# Patient Record
Sex: FEMALE | Race: WHITE | NOT HISPANIC OR LATINO | Employment: UNEMPLOYED | URBAN - METROPOLITAN AREA
[De-identification: names, ages, dates, MRNs, and addresses within clinical notes are randomized per-mention and may not be internally consistent; named-entity substitution may affect disease eponyms.]

---

## 2018-11-26 ENCOUNTER — ANESTHESIA (OUTPATIENT)
Dept: SURGERY | Facility: HOSPITAL | Age: 78
DRG: 481 | End: 2018-11-26
Payer: COMMERCIAL

## 2018-11-26 ENCOUNTER — ANESTHESIA EVENT (OUTPATIENT)
Dept: SURGERY | Facility: HOSPITAL | Age: 78
DRG: 481 | End: 2018-11-26
Payer: COMMERCIAL

## 2018-11-26 ENCOUNTER — HOSPITAL ENCOUNTER (INPATIENT)
Facility: HOSPITAL | Age: 78
LOS: 3 days | Discharge: NURSING, REHAB OR LONG-TERM CARE | DRG: 481 | End: 2018-11-29
Attending: EMERGENCY MEDICINE | Admitting: HOSPITALIST
Payer: COMMERCIAL

## 2018-11-26 DIAGNOSIS — S72.142A CLOSED DISPLACED INTERTROCHANTERIC FRACTURE OF LEFT FEMUR, INITIAL ENCOUNTER: Primary | ICD-10-CM

## 2018-11-26 DIAGNOSIS — Z01.818 PRE-OP EVALUATION: ICD-10-CM
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
## 2018-11-26 DIAGNOSIS — S72.142A CLOSED DISPLACED INTERTROCHANTERIC FRACTURE OF LEFT FEMUR: ICD-10-CM

## 2018-11-26 PROBLEM — D72.829 LEUKOCYTOSIS: Status: ACTIVE | Noted: 2018-11-26

## 2018-11-26 PROBLEM — E11.9 TYPE 2 DIABETES MELLITUS: Status: ACTIVE | Noted: 2018-11-26

## 2018-11-26 PROBLEM — K21.9 GERD (GASTROESOPHAGEAL REFLUX DISEASE): Status: ACTIVE | Noted: 2018-11-26

## 2018-11-26 PROBLEM — E78.5 HLD (HYPERLIPIDEMIA): Status: ACTIVE | Noted: 2018-11-26

## 2018-11-26 PROBLEM — I10 ESSENTIAL HYPERTENSION: Status: ACTIVE | Noted: 2018-11-26

## 2018-11-26 LAB
ABO + RH BLD: NORMAL
ALBUMIN SERPL BCP-MCNC: 3.8 G/DL
ALP SERPL-CCNC: 67 U/L
ALT SERPL W/O P-5'-P-CCNC: 11 U/L
ANION GAP SERPL CALC-SCNC: 10 MMOL/L
AST SERPL-CCNC: 17 U/L
BASOPHILS # BLD AUTO: 0.02 K/UL
BASOPHILS NFR BLD: 0.1 %
BILIRUB SERPL-MCNC: 0.3 MG/DL
BILIRUB UR QL STRIP: NEGATIVE
BLD GP AB SCN CELLS X3 SERPL QL: NORMAL
BUN SERPL-MCNC: 12 MG/DL
CALCIUM SERPL-MCNC: 9.7 MG/DL
CHLORIDE SERPL-SCNC: 99 MMOL/L
CLARITY UR: CLEAR
CO2 SERPL-SCNC: 28 MMOL/L
COLOR UR: YELLOW
CREAT SERPL-MCNC: 0.6 MG/DL
DIFFERENTIAL METHOD: ABNORMAL
EOSINOPHIL # BLD AUTO: 0.1 K/UL
EOSINOPHIL NFR BLD: 0.3 %
ERYTHROCYTE [DISTWIDTH] IN BLOOD BY AUTOMATED COUNT: 12.5 %
EST. GFR  (AFRICAN AMERICAN): >60 ML/MIN/1.73 M^2
EST. GFR  (NON AFRICAN AMERICAN): >60 ML/MIN/1.73 M^2
ESTIMATED AVG GLUCOSE: 126 MG/DL
GLUCOSE SERPL-MCNC: 167 MG/DL
GLUCOSE UR QL STRIP: NEGATIVE
HBA1C MFR BLD HPLC: 6 %
HCT VFR BLD AUTO: 36 %
HGB BLD-MCNC: 11.1 G/DL
HGB UR QL STRIP: NEGATIVE
INR PPP: 1
KETONES UR QL STRIP: NEGATIVE
LEUKOCYTE ESTERASE UR QL STRIP: NEGATIVE
LYMPHOCYTES # BLD AUTO: 1.4 K/UL
LYMPHOCYTES NFR BLD: 9 %
MCH RBC QN AUTO: 27.5 PG
MCHC RBC AUTO-ENTMCNC: 30.8 G/DL
MCV RBC AUTO: 89 FL
MONOCYTES # BLD AUTO: 0.7 K/UL
MONOCYTES NFR BLD: 4.5 %
NEUTROPHILS # BLD AUTO: 13 K/UL
NEUTROPHILS NFR BLD: 85.8 %
NITRITE UR QL STRIP: NEGATIVE
PH UR STRIP: 7 [PH] (ref 5–8)
PLATELET # BLD AUTO: 261 K/UL
PMV BLD AUTO: 9.1 FL
POTASSIUM SERPL-SCNC: 3.7 MMOL/L
PROT SERPL-MCNC: 7 G/DL
PROT UR QL STRIP: NEGATIVE
PROTHROMBIN TIME: 10.7 SEC
RBC # BLD AUTO: 4.03 M/UL
SODIUM SERPL-SCNC: 137 MMOL/L
SP GR UR STRIP: 1.02 (ref 1–1.03)
URN SPEC COLLECT METH UR: NORMAL
UROBILINOGEN UR STRIP-ACNC: NEGATIVE EU/DL
WBC # BLD AUTO: 15.16 K/UL

## 2018-11-26 PROCEDURE — 27245 TREAT THIGH FRACTURE: CPT | Mod: LT,,, | Performed by: ORTHOPAEDIC SURGERY

## 2018-11-26 PROCEDURE — 83036 HEMOGLOBIN GLYCOSYLATED A1C: CPT

## 2018-11-26 PROCEDURE — 25000003 PHARM REV CODE 250: Performed by: EMERGENCY MEDICINE

## 2018-11-26 PROCEDURE — 96361 HYDRATE IV INFUSION ADD-ON: CPT

## 2018-11-26 PROCEDURE — 80053 COMPREHEN METABOLIC PANEL: CPT

## 2018-11-26 PROCEDURE — 71000033 HC RECOVERY, INTIAL HOUR: Performed by: ORTHOPAEDIC SURGERY

## 2018-11-26 PROCEDURE — 36000711: Performed by: ORTHOPAEDIC SURGERY

## 2018-11-26 PROCEDURE — P9045 ALBUMIN (HUMAN), 5%, 250 ML: HCPCS | Mod: JG | Performed by: NURSE ANESTHETIST, CERTIFIED REGISTERED

## 2018-11-26 PROCEDURE — 86850 RBC ANTIBODY SCREEN: CPT

## 2018-11-26 PROCEDURE — 96374 THER/PROPH/DIAG INJ IV PUSH: CPT

## 2018-11-26 PROCEDURE — 25000003 PHARM REV CODE 250: Performed by: ORTHOPAEDIC SURGERY

## 2018-11-26 PROCEDURE — 86920 COMPATIBILITY TEST SPIN: CPT

## 2018-11-26 PROCEDURE — C1769 GUIDE WIRE: HCPCS | Performed by: ORTHOPAEDIC SURGERY

## 2018-11-26 PROCEDURE — 37000009 HC ANESTHESIA EA ADD 15 MINS: Performed by: ORTHOPAEDIC SURGERY

## 2018-11-26 PROCEDURE — 0QS706Z REPOSITION LEFT UPPER FEMUR WITH INTRAMEDULLARY INTERNAL FIXATION DEVICE, OPEN APPROACH: ICD-10-PCS | Performed by: ORTHOPAEDIC SURGERY

## 2018-11-26 PROCEDURE — 63600175 PHARM REV CODE 636 W HCPCS: Performed by: NURSE ANESTHETIST, CERTIFIED REGISTERED

## 2018-11-26 PROCEDURE — C1713 ANCHOR/SCREW BN/BN,TIS/BN: HCPCS | Performed by: ORTHOPAEDIC SURGERY

## 2018-11-26 PROCEDURE — 99285 EMERGENCY DEPT VISIT HI MDM: CPT | Mod: 25

## 2018-11-26 PROCEDURE — 25000003 PHARM REV CODE 250: Performed by: NURSE ANESTHETIST, CERTIFIED REGISTERED

## 2018-11-26 PROCEDURE — 37000008 HC ANESTHESIA 1ST 15 MINUTES: Performed by: ORTHOPAEDIC SURGERY

## 2018-11-26 PROCEDURE — 81003 URINALYSIS AUTO W/O SCOPE: CPT

## 2018-11-26 PROCEDURE — 63600175 PHARM REV CODE 636 W HCPCS: Performed by: ORTHOPAEDIC SURGERY

## 2018-11-26 PROCEDURE — 51702 INSERT TEMP BLADDER CATH: CPT

## 2018-11-26 PROCEDURE — 36000710: Performed by: ORTHOPAEDIC SURGERY

## 2018-11-26 PROCEDURE — 85610 PROTHROMBIN TIME: CPT

## 2018-11-26 PROCEDURE — 11000001 HC ACUTE MED/SURG PRIVATE ROOM

## 2018-11-26 PROCEDURE — 25000003 PHARM REV CODE 250: Performed by: PHYSICIAN ASSISTANT

## 2018-11-26 PROCEDURE — 27201423 OPTIME MED/SURG SUP & DEVICES STERILE SUPPLY: Performed by: ORTHOPAEDIC SURGERY

## 2018-11-26 PROCEDURE — 0QS7XZZ REPOSITION LEFT UPPER FEMUR, EXTERNAL APPROACH: ICD-10-PCS | Performed by: ORTHOPAEDIC SURGERY

## 2018-11-26 PROCEDURE — 63600175 PHARM REV CODE 636 W HCPCS: Mod: JG | Performed by: NURSE ANESTHETIST, CERTIFIED REGISTERED

## 2018-11-26 PROCEDURE — 85025 COMPLETE CBC W/AUTO DIFF WBC: CPT

## 2018-11-26 PROCEDURE — 63600175 PHARM REV CODE 636 W HCPCS: Performed by: EMERGENCY MEDICINE

## 2018-11-26 DEVICE — IMPLANTABLE DEVICE: Type: IMPLANTABLE DEVICE | Site: HIP | Status: FUNCTIONAL

## 2018-11-26 DEVICE — SCREW STRDRV REC T25 5X44 TTNM: Type: IMPLANTABLE DEVICE | Site: HIP | Status: FUNCTIONAL

## 2018-11-26 DEVICE — NAIL IM CANN 130 DEG 11X340 L: Type: IMPLANTABLE DEVICE | Site: HIP | Status: FUNCTIONAL

## 2018-11-26 RX ORDER — POLYETHYLENE GLYCOL 3350 17 G/17G
17 POWDER, FOR SOLUTION ORAL DAILY
Status: DISCONTINUED | OUTPATIENT
Start: 2018-11-27 | End: 2018-11-26

## 2018-11-26 RX ORDER — METFORMIN HYDROCHLORIDE 500 MG/1
500 TABLET ORAL 2 TIMES DAILY WITH MEALS
COMMUNITY

## 2018-11-26 RX ORDER — ALBUMIN HUMAN 50 G/1000ML
SOLUTION INTRAVENOUS CONTINUOUS PRN
Status: DISCONTINUED | OUTPATIENT
Start: 2018-11-26 | End: 2018-11-26

## 2018-11-26 RX ORDER — HYDROMORPHONE HYDROCHLORIDE 1 MG/ML
0.5 INJECTION, SOLUTION INTRAMUSCULAR; INTRAVENOUS; SUBCUTANEOUS
Status: COMPLETED | OUTPATIENT
Start: 2018-11-26 | End: 2018-11-26

## 2018-11-26 RX ORDER — ATORVASTATIN CALCIUM 10 MG/1
10 TABLET, FILM COATED ORAL DAILY
COMMUNITY

## 2018-11-26 RX ORDER — HYDROCHLOROTHIAZIDE 12.5 MG/1
12.5 TABLET ORAL DAILY
Status: DISCONTINUED | OUTPATIENT
Start: 2018-11-27 | End: 2018-11-27

## 2018-11-26 RX ORDER — GLYCOPYRROLATE 0.2 MG/ML
INJECTION INTRAMUSCULAR; INTRAVENOUS
Status: DISCONTINUED | OUTPATIENT
Start: 2018-11-26 | End: 2018-11-26

## 2018-11-26 RX ORDER — SUCCINYLCHOLINE CHLORIDE 20 MG/ML
INJECTION INTRAMUSCULAR; INTRAVENOUS
Status: DISCONTINUED | OUTPATIENT
Start: 2018-11-26 | End: 2018-11-26

## 2018-11-26 RX ORDER — ONDANSETRON 2 MG/ML
4 INJECTION INTRAMUSCULAR; INTRAVENOUS EVERY 8 HOURS PRN
Status: DISCONTINUED | OUTPATIENT
Start: 2018-11-26 | End: 2018-11-29 | Stop reason: HOSPADM

## 2018-11-26 RX ORDER — DEXAMETHASONE SODIUM PHOSPHATE 4 MG/ML
INJECTION, SOLUTION INTRA-ARTICULAR; INTRALESIONAL; INTRAMUSCULAR; INTRAVENOUS; SOFT TISSUE
Status: DISCONTINUED | OUTPATIENT
Start: 2018-11-26 | End: 2018-11-26

## 2018-11-26 RX ORDER — CEFAZOLIN SODIUM 1 G/50ML
1 SOLUTION INTRAVENOUS
Status: COMPLETED | OUTPATIENT
Start: 2018-11-27 | End: 2018-11-27

## 2018-11-26 RX ORDER — HYDROCODONE BITARTRATE AND ACETAMINOPHEN 5; 325 MG/1; MG/1
1 TABLET ORAL EVERY 4 HOURS PRN
Status: DISCONTINUED | OUTPATIENT
Start: 2018-11-26 | End: 2018-11-29 | Stop reason: HOSPADM

## 2018-11-26 RX ORDER — ONDANSETRON 2 MG/ML
4 INJECTION INTRAMUSCULAR; INTRAVENOUS DAILY PRN
Status: DISCONTINUED | OUTPATIENT
Start: 2018-11-26 | End: 2018-11-26 | Stop reason: HOSPADM

## 2018-11-26 RX ORDER — IBUPROFEN 200 MG
16 TABLET ORAL
Status: DISCONTINUED | OUTPATIENT
Start: 2018-11-26 | End: 2018-11-29 | Stop reason: HOSPADM

## 2018-11-26 RX ORDER — AMOXICILLIN 250 MG
1 CAPSULE ORAL 2 TIMES DAILY
Status: DISCONTINUED | OUTPATIENT
Start: 2018-11-26 | End: 2018-11-29 | Stop reason: HOSPADM

## 2018-11-26 RX ORDER — ROCURONIUM BROMIDE 10 MG/ML
INJECTION, SOLUTION INTRAVENOUS
Status: DISCONTINUED | OUTPATIENT
Start: 2018-11-26 | End: 2018-11-26

## 2018-11-26 RX ORDER — ASPIRIN 81 MG/1
81 TABLET ORAL DAILY
COMMUNITY

## 2018-11-26 RX ORDER — PHENYLEPHRINE HYDROCHLORIDE 10 MG/ML
INJECTION INTRAVENOUS
Status: DISCONTINUED | OUTPATIENT
Start: 2018-11-26 | End: 2018-11-26

## 2018-11-26 RX ORDER — GLUCAGON 1 MG
1 KIT INJECTION
Status: DISCONTINUED | OUTPATIENT
Start: 2018-11-26 | End: 2018-11-29 | Stop reason: HOSPADM

## 2018-11-26 RX ORDER — NEOSTIGMINE METHYLSULFATE 1 MG/ML
INJECTION, SOLUTION INTRAVENOUS
Status: DISCONTINUED | OUTPATIENT
Start: 2018-11-26 | End: 2018-11-26

## 2018-11-26 RX ORDER — ATORVASTATIN CALCIUM 10 MG/1
10 TABLET, FILM COATED ORAL DAILY
Status: DISCONTINUED | OUTPATIENT
Start: 2018-11-27 | End: 2018-11-29 | Stop reason: HOSPADM

## 2018-11-26 RX ORDER — IBUPROFEN 200 MG
24 TABLET ORAL
Status: DISCONTINUED | OUTPATIENT
Start: 2018-11-26 | End: 2018-11-29 | Stop reason: HOSPADM

## 2018-11-26 RX ORDER — SODIUM CHLORIDE 9 MG/ML
1000 INJECTION, SOLUTION INTRAVENOUS
Status: COMPLETED | OUTPATIENT
Start: 2018-11-26 | End: 2018-11-26

## 2018-11-26 RX ORDER — MORPHINE SULFATE 4 MG/ML
4 INJECTION, SOLUTION INTRAMUSCULAR; INTRAVENOUS EVERY 4 HOURS PRN
Status: DISCONTINUED | OUTPATIENT
Start: 2018-11-26 | End: 2018-11-29

## 2018-11-26 RX ORDER — ONDANSETRON 8 MG/1
8 TABLET, ORALLY DISINTEGRATING ORAL EVERY 8 HOURS PRN
Status: DISCONTINUED | OUTPATIENT
Start: 2018-11-26 | End: 2018-11-29 | Stop reason: HOSPADM

## 2018-11-26 RX ORDER — LOSARTAN POTASSIUM 50 MG/1
50 TABLET ORAL DAILY
Status: DISCONTINUED | OUTPATIENT
Start: 2018-11-27 | End: 2018-11-27

## 2018-11-26 RX ORDER — INSULIN ASPART 100 [IU]/ML
0-5 INJECTION, SOLUTION INTRAVENOUS; SUBCUTANEOUS
Status: DISCONTINUED | OUTPATIENT
Start: 2018-11-26 | End: 2018-11-29 | Stop reason: HOSPADM

## 2018-11-26 RX ORDER — HYDROMORPHONE HYDROCHLORIDE 2 MG/ML
0.5 INJECTION, SOLUTION INTRAMUSCULAR; INTRAVENOUS; SUBCUTANEOUS EVERY 5 MIN PRN
Status: DISCONTINUED | OUTPATIENT
Start: 2018-11-26 | End: 2018-11-26 | Stop reason: HOSPADM

## 2018-11-26 RX ORDER — PROPOFOL 10 MG/ML
VIAL (ML) INTRAVENOUS
Status: DISCONTINUED | OUTPATIENT
Start: 2018-11-26 | End: 2018-11-26

## 2018-11-26 RX ORDER — LOSARTAN POTASSIUM AND HYDROCHLOROTHIAZIDE 12.5; 5 MG/1; MG/1
1 TABLET ORAL DAILY
COMMUNITY
End: 2019-01-07

## 2018-11-26 RX ORDER — LIDOCAINE HCL/PF 100 MG/5ML
SYRINGE (ML) INTRAVENOUS
Status: DISCONTINUED | OUTPATIENT
Start: 2018-11-26 | End: 2018-11-26

## 2018-11-26 RX ORDER — BACITRACIN 50000 [IU]/1
INJECTION, POWDER, FOR SOLUTION INTRAMUSCULAR
Status: DISCONTINUED | OUTPATIENT
Start: 2018-11-26 | End: 2018-11-26 | Stop reason: HOSPADM

## 2018-11-26 RX ORDER — OXYCODONE HYDROCHLORIDE 5 MG/1
5 TABLET ORAL
Status: DISCONTINUED | OUTPATIENT
Start: 2018-11-26 | End: 2018-11-26 | Stop reason: HOSPADM

## 2018-11-26 RX ORDER — OXYCODONE AND ACETAMINOPHEN 7.5; 325 MG/1; MG/1
1 TABLET ORAL EVERY 4 HOURS PRN
Status: DISCONTINUED | OUTPATIENT
Start: 2018-11-26 | End: 2018-11-29

## 2018-11-26 RX ORDER — ACETAMINOPHEN 325 MG/1
650 TABLET ORAL EVERY 4 HOURS PRN
Status: DISCONTINUED | OUTPATIENT
Start: 2018-11-26 | End: 2018-11-29 | Stop reason: HOSPADM

## 2018-11-26 RX ORDER — FENTANYL CITRATE 50 UG/ML
INJECTION, SOLUTION INTRAMUSCULAR; INTRAVENOUS
Status: DISCONTINUED | OUTPATIENT
Start: 2018-11-26 | End: 2018-11-26

## 2018-11-26 RX ORDER — SODIUM CHLORIDE AND POTASSIUM CHLORIDE 150; 900 MG/100ML; MG/100ML
INJECTION, SOLUTION INTRAVENOUS CONTINUOUS
Status: DISCONTINUED | OUTPATIENT
Start: 2018-11-26 | End: 2018-11-29

## 2018-11-26 RX ORDER — SODIUM CHLORIDE 0.9 % (FLUSH) 0.9 %
5 SYRINGE (ML) INJECTION
Status: DISCONTINUED | OUTPATIENT
Start: 2018-11-26 | End: 2018-11-29 | Stop reason: HOSPADM

## 2018-11-26 RX ORDER — ENOXAPARIN SODIUM 100 MG/ML
30 INJECTION SUBCUTANEOUS EVERY 24 HOURS
Status: DISCONTINUED | OUTPATIENT
Start: 2018-11-27 | End: 2018-11-27

## 2018-11-26 RX ORDER — IPRATROPIUM BROMIDE AND ALBUTEROL SULFATE 2.5; .5 MG/3ML; MG/3ML
3 SOLUTION RESPIRATORY (INHALATION) EVERY 4 HOURS PRN
Status: DISCONTINUED | OUTPATIENT
Start: 2018-11-26 | End: 2018-11-29 | Stop reason: HOSPADM

## 2018-11-26 RX ORDER — SODIUM CHLORIDE, SODIUM LACTATE, POTASSIUM CHLORIDE, CALCIUM CHLORIDE 600; 310; 30; 20 MG/100ML; MG/100ML; MG/100ML; MG/100ML
INJECTION, SOLUTION INTRAVENOUS CONTINUOUS PRN
Status: DISCONTINUED | OUTPATIENT
Start: 2018-11-26 | End: 2018-11-26

## 2018-11-26 RX ORDER — FAMOTIDINE 20 MG/1
20 TABLET, FILM COATED ORAL 2 TIMES DAILY
Status: DISCONTINUED | OUTPATIENT
Start: 2018-11-26 | End: 2018-11-29 | Stop reason: HOSPADM

## 2018-11-26 RX ORDER — ONDANSETRON 2 MG/ML
INJECTION INTRAMUSCULAR; INTRAVENOUS
Status: DISCONTINUED | OUTPATIENT
Start: 2018-11-26 | End: 2018-11-26

## 2018-11-26 RX ORDER — POLYETHYLENE GLYCOL 3350 17 G/17G
17 POWDER, FOR SOLUTION ORAL DAILY
Status: DISCONTINUED | OUTPATIENT
Start: 2018-11-27 | End: 2018-11-27

## 2018-11-26 RX ORDER — ASPIRIN 81 MG/1
81 TABLET ORAL DAILY
Status: DISCONTINUED | OUTPATIENT
Start: 2018-11-27 | End: 2018-11-29 | Stop reason: HOSPADM

## 2018-11-26 RX ORDER — TRANEXAMIC ACID 100 MG/ML
INJECTION, SOLUTION INTRAVENOUS
Status: DISCONTINUED | OUTPATIENT
Start: 2018-11-26 | End: 2018-11-26

## 2018-11-26 RX ADMIN — FENTANYL CITRATE 50 MCG: 50 INJECTION, SOLUTION INTRAMUSCULAR; INTRAVENOUS at 06:11

## 2018-11-26 RX ADMIN — FENTANYL CITRATE 25 MCG: 50 INJECTION, SOLUTION INTRAMUSCULAR; INTRAVENOUS at 07:11

## 2018-11-26 RX ADMIN — SODIUM CHLORIDE 1000 ML: 0.9 INJECTION, SOLUTION INTRAVENOUS at 04:11

## 2018-11-26 RX ADMIN — PHENYLEPHRINE HYDROCHLORIDE 200 MCG: 10 INJECTION INTRAVENOUS at 05:11

## 2018-11-26 RX ADMIN — STANDARDIZED SENNA CONCENTRATE AND DOCUSATE SODIUM 1 TABLET: 8.6; 5 TABLET, FILM COATED ORAL at 09:11

## 2018-11-26 RX ADMIN — LIDOCAINE HYDROCHLORIDE 100 MG: 20 INJECTION, SOLUTION INTRAVENOUS at 05:11

## 2018-11-26 RX ADMIN — ONDANSETRON 8 MG: 2 INJECTION, SOLUTION INTRAMUSCULAR; INTRAVENOUS at 07:11

## 2018-11-26 RX ADMIN — ROCURONIUM BROMIDE 10 MG: 10 INJECTION, SOLUTION INTRAVENOUS at 07:11

## 2018-11-26 RX ADMIN — GLYCOPYRROLATE 0.2 MG: 0.2 INJECTION, SOLUTION INTRAMUSCULAR; INTRAVENOUS at 06:11

## 2018-11-26 RX ADMIN — TRANEXAMIC ACID 1000 MG: 100 INJECTION, SOLUTION INTRAVENOUS at 05:11

## 2018-11-26 RX ADMIN — FAMOTIDINE 20 MG: 20 TABLET ORAL at 09:11

## 2018-11-26 RX ADMIN — PHENYLEPHRINE HYDROCHLORIDE 100 MCG: 10 INJECTION INTRAVENOUS at 05:11

## 2018-11-26 RX ADMIN — HYDROMORPHONE HYDROCHLORIDE 0.5 MG: 1 INJECTION, SOLUTION INTRAMUSCULAR; INTRAVENOUS; SUBCUTANEOUS at 02:11

## 2018-11-26 RX ADMIN — DEXTROSE 2 G: 50 INJECTION, SOLUTION INTRAVENOUS at 05:11

## 2018-11-26 RX ADMIN — FENTANYL CITRATE 25 MCG: 50 INJECTION, SOLUTION INTRAMUSCULAR; INTRAVENOUS at 08:11

## 2018-11-26 RX ADMIN — ALBUMIN (HUMAN): 2.5 SOLUTION INTRAVENOUS at 07:11

## 2018-11-26 RX ADMIN — SODIUM CHLORIDE, SODIUM LACTATE, POTASSIUM CHLORIDE, AND CALCIUM CHLORIDE: .6; .31; .03; .02 INJECTION, SOLUTION INTRAVENOUS at 05:11

## 2018-11-26 RX ADMIN — GLYCOPYRROLATE 0.8 MG: 0.2 INJECTION, SOLUTION INTRAMUSCULAR; INTRAVENOUS at 07:11

## 2018-11-26 RX ADMIN — VASOPRESSIN 1 UNITS: 20 INJECTION, SOLUTION INTRAMUSCULAR; SUBCUTANEOUS at 06:11

## 2018-11-26 RX ADMIN — HYDROCODONE BITARTRATE AND ACETAMINOPHEN 1 TABLET: 5; 325 TABLET ORAL at 08:11

## 2018-11-26 RX ADMIN — ROCURONIUM BROMIDE 5 MG: 10 INJECTION, SOLUTION INTRAVENOUS at 05:11

## 2018-11-26 RX ADMIN — SUCCINYLCHOLINE CHLORIDE 120 MG: 20 INJECTION, SOLUTION INTRAMUSCULAR; INTRAVENOUS at 05:11

## 2018-11-26 RX ADMIN — ROCURONIUM BROMIDE 25 MG: 10 INJECTION, SOLUTION INTRAVENOUS at 05:11

## 2018-11-26 RX ADMIN — DEXAMETHASONE SODIUM PHOSPHATE 8 MG: 4 INJECTION, SOLUTION INTRAMUSCULAR; INTRAVENOUS at 05:11

## 2018-11-26 RX ADMIN — NEOSTIGMINE METHYLSULFATE 5 MG: 1 INJECTION INTRAVENOUS at 07:11

## 2018-11-26 RX ADMIN — PHENYLEPHRINE HYDROCHLORIDE 100 MCG: 10 INJECTION INTRAVENOUS at 06:11

## 2018-11-26 RX ADMIN — PHENYLEPHRINE HYDROCHLORIDE 300 MCG: 10 INJECTION INTRAVENOUS at 06:11

## 2018-11-26 RX ADMIN — FENTANYL CITRATE 150 MCG: 50 INJECTION, SOLUTION INTRAMUSCULAR; INTRAVENOUS at 05:11

## 2018-11-26 RX ADMIN — ALBUMIN (HUMAN): 2.5 SOLUTION INTRAVENOUS at 06:11

## 2018-11-26 RX ADMIN — PROPOFOL 100 MG: 10 INJECTION, EMULSION INTRAVENOUS at 05:11

## 2018-11-26 RX ADMIN — POTASSIUM CHLORIDE AND SODIUM CHLORIDE: 900; 150 INJECTION, SOLUTION INTRAVENOUS at 10:11

## 2018-11-26 NOTE — OP NOTE
DATE OF PROCEDURE:   11/26/2018    PREOPERATIVE DIAGNOSIS:  Left hip subtrochanteric femur fracture, displaced.    POSTOPERATIVE DIAGNOSIS:  Left hip subtrochanteric femur fracture, displaced.    OPERATIVE PROCEDURE:  Intramedullary emelina fixation (Synthes TFN nail)   interlocking screws, left femur subtrochanteric fracture.    SURGEON:  Keshav Guthrie Jr., M.D.    ANESTHESIA:  General endotracheal.    ESTIMATED BLOOD LOSS:  450 mL    COMPLICATIONS:  None.    SPECIMENS:  None.    BRIEF INDICATIONS:  A 78-year-old female fell, sustained a complex subtroch   fracture, left hip taken to surgery for the above procedure.    OPERATIVE PROCEDURE IN DETAIL:  After operative consent was obtained, the   patient brought to the Operating Room, placed supine on the operating room   table.  Anesthesia by GET method performed by the Anesthesia staff.  After the   patient was asleep, carefully transferred to the fracture table.  The left leg   placed in longitudinal traction and a closed reduction performed with C-arm   control, the right leg flexed at the hip and knee carefully padded.  The   fracture was complex, subtrochanteric, and displaced.  There was flexion to the   proximal fragment and rotation, which could not be entirely corrected with a   closed reduction maneuver.  It was felt that this was acceptable.  However, the   surgery was started.  The left hip was prepped and draped out in the normal   sterile fashion.  The C-arm was brought to localize the skin incision made with   a 10 blade beginning at the greater trochanter and extending proximally.  Deep   dissection was divided in line with the skin.  The deep fascia opened with the   Bovie and a finger used to palpate the greater trochanter.  An awl was used to   enter the greater trochanter under C-arm control and a guidewire was used to   enter the femoral canal across the fracture site and under C-arm control passed   into the distal fragment.  The guidewire was  placed all the way to the knee and   the correct length was sounded at 340 mm.  The reamer was then used to carefully   ream over the guidewire; first proximally and then distally all the way up to a   12.5 carefully preventing any additional fractures.  Then, after removal of the   reamer, the emelina, the Synthes TFN nail was carefully placed over the guidewire,   and this had the effect of reducing the fracture for the most part carefully   impacted into place.  Under C-arm control, the screw holes were lined up with   the femoral neck and head, checked in AP and lateral planes and then the   outrigger was used to make a skin incision followed by percutaneous drilling   into the femoral neck and head under C-arm control in the center, AP and lateral   planes.  This was followed by drilling and insertion of a 105 mm screw into the   femoral neck and head capturing the fragment in good position.  Some   compression was then performed compressed the fragment; however, there was some   flexion and rotation that could not be corrected.  This was acceptable.    The proximal locking screw was used to lock in the lag screw and then the   outrigger was removed.  Attention then turned to the distal locking screw.  The   leg was abducted slightly and a C-arm brought into a lateral position to allow   fluoroscopic alignment and guidance.  Followed by a percutaneous small stab   incision and the drill inserted down to the bone, checked under perfect [place]   technique, drilling directly across first pass was successful with both cortices   followed by insertion of a 44 mm distal locking screw, achieving good purchase,   checked in AP and lateral planes.  The C-arm then removed after final pictures   were taken and compression performed.  All 3 incisions were then thoroughly   irrigated with antibiotic saline solution.  Hemostasis achieved with the Bovie.    The deep fascia closed with #1 Vicryl, subcutaneous tissue closed with  2-0   Vicryl and staples on the skin.  Sterile dressings applied followed by an ABD   pad.  The patient then extubated and brought to the Recovery Room in stable   condition.  All sponge and needle counts reported as correct.  No complications.      SANDRA/SUDHA  dd: 11/26/2018 20:21:58 (CST)  td: 11/26/2018 22:29:55 (CST)  Doc ID   #[id number]  Job ID #[id number]    CC:

## 2018-11-26 NOTE — HPI
"Ms. Luda Carranza is a 79 yo woman with HTN, HLD, GERD, and NIDDM II who presents with hip fracture. History is limited by a language barrier and her son-in-law acts as . At the time of interview pt is very drowsy from pain medication. She is visiting her family in the [place] from [place]. She is generally very independent and walks unassisted. She has occasional issues with balance. She does yoga daily. JYOTI reports that she had a fall from standing when she was walking in the kitchen and "lost balance", after which she experienced significant pain in the left hip. Her family brought her to the ED, where she was found to have a comminuted intertrochanteric left femur fracture. CXR showed diffuse nonspecific interstitial coarsening, possibly mild pulmonary edema. Vital signs were stable other than an elevated BP (-183). Labs were significant for WBC 15. UA was negative  She was admitted to hospital medicine with an ortho consult, planning for repair the evening of 11/26.   "

## 2018-11-26 NOTE — SUBJECTIVE & OBJECTIVE
Past Medical History:   Diagnosis Date    Diabetes mellitus     GERD (gastroesophageal reflux disease)     Hypercholesteremia        History reviewed. No pertinent surgical history.    Review of patient's allergies indicates:  No Known Allergies    No current facility-administered medications on file prior to encounter.      Current Outpatient Medications on File Prior to Encounter   Medication Sig    aspirin (ECOTRIN) 81 MG EC tablet Take 81 mg by mouth once daily.    atorvastatin (LIPITOR) 10 MG tablet Take 10 mg by mouth once daily.    losartan-hydrochlorothiazide 50-12.5 mg (HYZAAR) 50-12.5 mg per tablet Take 1 tablet by mouth once daily.    metFORMIN (GLUCOPHAGE) 500 MG tablet Take 500 mg by mouth 2 (two) times daily with meals.    ranitidine (ZANTAC) 300 MG tablet Take 300 mg by mouth every evening.     Family History     None        Tobacco Use    Smoking status: Never Smoker    Smokeless tobacco: Never Used   Substance and Sexual Activity    Alcohol use: No     Frequency: Never    Drug use: No    Sexual activity: Not on file     Review of Systems   Unable to perform ROS: Other (language barrier, pt drowsy)   Constitutional: Negative for fever.   Respiratory: Negative for shortness of breath.    Musculoskeletal: Positive for gait problem.   Psychiatric/Behavioral: Negative for confusion.     Objective:     Vital Signs (Most Recent):  Temp: 98 °F (36.7 °C) (11/26/18 1355)  Pulse: 77 (11/26/18 1659)  Resp: 16 (11/26/18 1659)  BP: (!) 150/67 (11/26/18 1659)  SpO2: 100 % (11/26/18 1659) Vital Signs (24h Range):  Temp:  [98 °F (36.7 °C)] 98 °F (36.7 °C)  Pulse:  [64-77] 77  Resp:  [16] 16  SpO2:  [100 %] 100 %  BP: (150-183)/(67-88) 150/67     Weight: 70 kg (154 lb 5.2 oz)  Body mass index is 27.34 kg/m².    Physical Exam   Constitutional: She appears well-developed and well-nourished. No distress.   HENT:   Head: Normocephalic and atraumatic.   Eyes: EOM are normal. Pupils are equal, round, and  reactive to light.   Cardiovascular: Normal rate and regular rhythm.   Pulmonary/Chest: Effort normal and breath sounds normal.   CTA anteriorly   Abdominal: Soft. Bowel sounds are normal.   Musculoskeletal: She exhibits tenderness and deformity.   L leg shortened and externally rotated  Pulses and sensation intact L foot   Neurological: She is alert. No cranial nerve deficit or sensory deficit. She exhibits normal muscle tone.   Skin: Skin is warm and dry. She is not diaphoretic.   Psychiatric: She has a normal mood and affect. Her behavior is normal.         CRANIAL NERVES     CN III, IV, VI   Pupils are equal, round, and reactive to light.  Extraocular motions are normal.        Significant Labs:   BMP:   Recent Labs   Lab 11/26/18  1606   *      K 3.7   CL 99   CO2 28   BUN 12   CREATININE 0.6   CALCIUM 9.7     CBC:   Recent Labs   Lab 11/26/18  1606   WBC 15.16*   HGB 11.1*   HCT 36.0*        Coagulation:   Recent Labs   Lab 11/26/18  1606   INR 1.0     Urine Studies:   Recent Labs   Lab 11/26/18  1606   COLORU Yellow   APPEARANCEUA Clear   PHUR 7.0   SPECGRAV 1.020   PROTEINUA Negative   GLUCUA Negative   KETONESU Negative   BILIRUBINUA Negative   OCCULTUA Negative   NITRITE Negative   UROBILINOGEN Negative   LEUKOCYTESUR Negative       Significant Imaging: I have reviewed all pertinent imaging results/findings within the past 24 hours.   Imaging Results          X-Ray Chest 1 View (Final result)  Result time 11/26/18 15:43:05    Final result by Doug Greer MD (11/26/18 15:43:05)                 Impression:      As above.      Electronically signed by: Doug Greer MD  Date:    11/26/2018  Time:    15:43             Narrative:    EXAMINATION:  XR CHEST 1 VIEW    CLINICAL HISTORY:  hip fracture;    TECHNIQUE:  Single frontal view of the chest was performed.    COMPARISON:  None    FINDINGS:  Patient is slightly rotated.  Cardiac silhouette is enlarged with bilateral mild diffuse  nonspecific interstitial coarsening which may represent mild pulmonary edema/CHF pattern, with interstitial pneumonia or chronic interstitial lung changes not excluded.  No focal consolidation, pleural effusion or pneumothorax.  Calcific atherosclerosis with tortuosity of the thoracic aorta.  Thickened right paratracheal stripe, commonly secondary to ectatic vasculature in this age group.  No acute osseous process seen.  PA and lateral views can be obtained.                               X-Ray Femur Ap/Lat Left (Final result)     Abnormal  Result time 11/26/18 15:02:17    Final result by Amaya Morrison MD (11/26/18 15:02:17)                 Impression:      Comminuted intertrochanteric left femur fracture.    This report was flagged in Epic as abnormal.      Electronically signed by: Amaya Morrison  Date:    11/26/2018  Time:    15:02             Narrative:    EXAMINATION:  XR FEMUR 2 VIEW LEFT; XR HIP 2 VIEW LEFT    CLINICAL HISTORY:  fall;Unspecified fall, initial encounter    TECHNIQUE:  Five views of the left femur were obtained.  Two views of the left hip were obtained.    COMPARISON:  None}    FINDINGS:  Comminuted intertrochanteric fracture with mild angulation and medial displacement of the lesser trochanter.    The femoral heads remain well seated within the acetabula.  The sacroiliac joints are symmetric.  Pubic symphysis is not widened.  No aggressive osseous abnormality.                               X-Ray Hip 2 View Left (Final result)     Abnormal  Result time 11/26/18 15:02:17    Final result by Amaya Morrison MD (11/26/18 15:02:17)                 Impression:      Comminuted intertrochanteric left femur fracture.    This report was flagged in Epic as abnormal.      Electronically signed by: Amaya Morrison  Date:    11/26/2018  Time:    15:02             Narrative:    EXAMINATION:  XR FEMUR 2 VIEW LEFT; XR HIP 2 VIEW LEFT    CLINICAL HISTORY:  fall;Unspecified fall, initial encounter    TECHNIQUE:  Five  views of the left femur were obtained.  Two views of the left hip were obtained.    COMPARISON:  None}    FINDINGS:  Comminuted intertrochanteric fracture with mild angulation and medial displacement of the lesser trochanter.    The femoral heads remain well seated within the acetabula.  The sacroiliac joints are symmetric.  Pubic symphysis is not widened.  No aggressive osseous abnormality.

## 2018-11-26 NOTE — ANESTHESIA PREPROCEDURE EVALUATION
11/26/2018  Luda Carranza is a 78 y.o., female with pmhx of HTN, DM2, GERD, anxiety, and obesity scheduled for ORIF Left femur     Patient visiting from [place], has PCP there. No personal hx of surgery, denies family hx of issues with anesthesia    Last meal 9am 11/26/18    Anesthesia Evaluation    I have reviewed the Patient Summary Reports.    I have reviewed the Nursing Notes.      Review of Systems  Anesthesia Hx:  No previous Anesthesia  Denies Family Hx of Anesthesia complications.   Denies Personal Hx of Anesthesia complications.   Social:  Non-Smoker    Cardiovascular:   Hypertension    Pulmonary:  Pulmonary Normal    Renal/:  Renal/ Normal     Hepatic/GI:   GERD, well controlled    Neurological:  Neurology Normal    Psych:   anxiety          Physical Exam  General:  Obesity    Airway/Jaw/Neck:  Airway Findings: Mouth Opening: Small, but > 3cm Tongue: Normal  Mallampati: III  TM Distance: 4 - 6 cm  Jaw/Neck Findings:  Neck ROM: Normal ROM      Dental:  Dental Findings:Denies loose/chipped teeth   Chest/Lungs:  Chest/Lungs Findings: Clear to auscultation     Heart/Vascular:  Heart Findings: Rate: Normal  Rhythm: Regular Rhythm             Anesthesia Plan  Type of Anesthesia, risks & benefits discussed:  Anesthesia Type:  general, regional, spinal, CSE  Patient's Preference:   Intra-op Monitoring Plan: standard ASA monitors  Intra-op Monitoring Plan Comments:   Post Op Pain Control Plan:   Post Op Pain Control Plan Comments:   Induction:   IV  Beta Blocker:  Patient is not currently on a Beta-Blocker (No further documentation required).       Informed Consent: Patient understands risks and agrees with Anesthesia plan.  Questions answered. Anesthesia consent signed with patient.  ASA Score: 3     Day of Surgery Review of History & Physical:            Ready For Surgery From Anesthesia Perspective.

## 2018-11-26 NOTE — ED PROVIDER NOTES
Encounter Date: 11/26/2018    SCRIBE #1 NOTE: I, Radha Marks, am scribing for, and in the presence of,  Dr. Tapan Nuñez. I have scribed the entire note.       History     Chief Complaint   Patient presents with    Hip Pain     fell on tile at home from standing position, complains of left hip pain     Luda Carranza is a 78 y.o. female who presents in the ED today via EMS with complaints of left hip pain since falling minutes prior to arrival. Per pt's son-in-law, she was standing in kitchen, when she appeared to have lost her balance and fell over onto the tile floor. Pt was unable to get up or stand after fall. Pt denies hitting her head at any time.           The history is provided by the patient and a relative. The history is limited by a language barrier. A  was used (son-in-law).     Review of patient's allergies indicates:  Allergies not on file  No past medical history on file.  No past surgical history on file.  No family history on file.  Social History     Tobacco Use    Smoking status: Not on file   Substance Use Topics    Alcohol use: Not on file    Drug use: Not on file     Review of Systems   Constitutional: Negative for chills and fever.   HENT: Negative for congestion, rhinorrhea and sore throat.    Eyes: Negative for redness and visual disturbance.   Respiratory: Negative for cough, shortness of breath and wheezing.    Cardiovascular: Negative for chest pain and palpitations.   Gastrointestinal: Negative for abdominal pain, diarrhea, nausea and vomiting.   Genitourinary: Negative for dysuria and hematuria.   Musculoskeletal: Positive for arthralgias (left hip pain). Negative for back pain, myalgias and neck pain.   Skin: Negative for rash.   Neurological: Negative for dizziness, weakness and light-headedness.   Psychiatric/Behavioral: Negative for confusion.   All other systems reviewed and are negative.      Physical Exam     Initial Vitals   BP Pulse Resp Temp SpO2    11/26/18 1355 11/26/18 1355 11/26/18 1453 11/26/18 1355 11/26/18 1355   (!) 183/88 68 16 98 °F (36.7 °C) 100 %      MAP       --                Physical Exam    Nursing note and vitals reviewed.  Constitutional: She appears well-developed and well-nourished. She is not diaphoretic. No distress.   HENT:   Head: Normocephalic and atraumatic.   Mouth/Throat: Oropharynx is clear and moist.   Eyes: Conjunctivae and EOM are normal. Pupils are equal, round, and reactive to light.   Neck: Normal range of motion. Neck supple.   Cardiovascular: Normal rate, regular rhythm and normal heart sounds. Exam reveals no gallop and no friction rub.    No murmur heard.  Pulmonary/Chest: Breath sounds normal. She has no wheezes. She has no rhonchi. She has no rales.   Abdominal: Soft. Bowel sounds are normal. There is no tenderness. There is no rebound and no guarding.   Musculoskeletal: She exhibits no edema or tenderness.   No cervical vertebral tenderness.   Tenderness over the greater trochanter of the left hip.  External rotation and shortening of left extremity.    Lymphadenopathy:     She has no cervical adenopathy.   Neurological: She is alert and oriented to person, place, and time. She has normal strength.   Skin: Skin is warm and dry. Capillary refill takes less than 2 seconds. No rash noted.         ED Course   Procedures  Labs Reviewed   CBC W/ AUTO DIFFERENTIAL - Abnormal; Notable for the following components:       Result Value    WBC 15.16 (*)     Hemoglobin 11.1 (*)     Hematocrit 36.0 (*)     MCHC 30.8 (*)     MPV 9.1 (*)     Gran # (ANC) 13.0 (*)     Gran% 85.8 (*)     Lymph% 9.0 (*)     All other components within normal limits   COMPREHENSIVE METABOLIC PANEL - Abnormal; Notable for the following components:    Glucose 167 (*)     All other components within normal limits   URINALYSIS   PROTIME-INR   HEMOGLOBIN A1C   TYPE & SCREEN     EKG Readings: (Independently Interpreted)   Rhythm: Normal Sinus Rhythm. Heart  Rate: 65. ST Segments: Normal ST Segments. ST Segment Depression: V5 and V6.       Imaging Results          X-Ray Chest 1 View (In process)                X-Ray Femur Ap/Lat Left (Final result)     Abnormal  Result time 11/26/18 15:02:17    Final result by Amaya Morrison MD (11/26/18 15:02:17)                 Impression:      Comminuted intertrochanteric left femur fracture.    This report was flagged in Epic as abnormal.      Electronically signed by: Amaya Morrison  Date:    11/26/2018  Time:    15:02             Narrative:    EXAMINATION:  XR FEMUR 2 VIEW LEFT; XR HIP 2 VIEW LEFT    CLINICAL HISTORY:  fall;Unspecified fall, initial encounter    TECHNIQUE:  Five views of the left femur were obtained.  Two views of the left hip were obtained.    COMPARISON:  None}    FINDINGS:  Comminuted intertrochanteric fracture with mild angulation and medial displacement of the lesser trochanter.    The femoral heads remain well seated within the acetabula.  The sacroiliac joints are symmetric.  Pubic symphysis is not widened.  No aggressive osseous abnormality.                               X-Ray Hip 2 View Left (Final result)     Abnormal  Result time 11/26/18 15:02:17    Final result by Amaya Morrison MD (11/26/18 15:02:17)                 Impression:      Comminuted intertrochanteric left femur fracture.    This report was flagged in Epic as abnormal.      Electronically signed by: Amaya Morrison  Date:    11/26/2018  Time:    15:02             Narrative:    EXAMINATION:  XR FEMUR 2 VIEW LEFT; XR HIP 2 VIEW LEFT    CLINICAL HISTORY:  fall;Unspecified fall, initial encounter    TECHNIQUE:  Five views of the left femur were obtained.  Two views of the left hip were obtained.    COMPARISON:  None}    FINDINGS:  Comminuted intertrochanteric fracture with mild angulation and medial displacement of the lesser trochanter.    The femoral heads remain well seated within the acetabula.  The sacroiliac joints are symmetric.  Pubic  symphysis is not widened.  No aggressive osseous abnormality.                                 Medical Decision Making:   Clinical Tests:   Radiological Study: Ordered and Reviewed  ED Management:  3:23 PM Case discussed with Orthopedics, Dr. Guthrie, and Internist, Dr. Rodney, who will admit pt.                         Clinical Impression:     1. Closed displaced intertrochanteric fracture of left femur, initial encounter    2. Fall    3. Pre-op evaluation                        I, Dr. Tapan Nuñez, personally performed the services described in this documentation. All medical record entries made by the scribe were at my direction and in my presence. I have reviewed the chart and agree that the record reflects my personal performance and is accurate and complete. Tapan Nuñez MD.  3:49 PM 11/26/2018                               Tapan Nuñez MD  11/26/18 [id number]

## 2018-11-26 NOTE — ASSESSMENT & PLAN NOTE
Pt with fall from standing d/t being off balance. XR showing L comminuted intertrochanteric fracture. Pt active and independent prior to fall.   -Attempt to clarify story when patient is no longer so drowsy  -Ortho consult, plan for OR 11/26  -Pain control and bowel regimen  -PT/OT

## 2018-11-26 NOTE — ASSESSMENT & PLAN NOTE
WBC mildly elevated at 15.16. UA negative, CXR showing a mild pulmonary edema pattern. Afebrile, HR normal, BP moderately elevated.   -Trend WBC

## 2018-11-26 NOTE — H&P
"[facility] Medicine  History & Physical    Patient Name: Luda Carranza  MRN: [id number]  Admission Date: 11/26/2018  Attending Physician: Tapan Nuñez, *   Primary Care Provider: Primary Doctor No         Patient information was obtained from patient, relative(s) and ER records.     Subjective:     Principal Problem:<principal problem not specified>    Chief Complaint:   Chief Complaint   Patient presents with    Hip Pain     fell on tile at home from standing position, complains of left hip pain        HPI: Ms. Luda Carranza is a 77 yo woman with HTN, HLD, GERD, and NIDDM II who presents with hip fracture. History is limited by a language barrier and her son-in-law acts as . At the time of interview pt is very drowsy from pain medication. She is visiting her family in the [place] from [place]. She is generally very independent and walks unassisted. She has occasional issues with balance. She does yoga daily. JYOTI reports that she had a fall from standing when she was walking in the kitchen and "lost balance", after which she experienced significant pain in the left hip. Her family brought her to the ED, where she was found to have a comminuted intertrochanteric left femur fracture. CXR showed diffuse nonspecific interstitial coarsening, possibly mild pulmonary edema. Vital signs were stable other than an elevated BP (-183). Labs were significant for WBC 15. UA was negative  She was admitted to hospital medicine with an ortho consult, planning for repair the evening of 11/26.     Past Medical History:   Diagnosis Date    Diabetes mellitus     GERD (gastroesophageal reflux disease)     Hypercholesteremia        History reviewed. No pertinent surgical history.    Review of patient's allergies indicates:  No Known Allergies    No current facility-administered medications on file prior to encounter.      Current Outpatient Medications on File Prior to Encounter "   Medication Sig    aspirin (ECOTRIN) 81 MG EC tablet Take 81 mg by mouth once daily.    atorvastatin (LIPITOR) 10 MG tablet Take 10 mg by mouth once daily.    losartan-hydrochlorothiazide 50-12.5 mg (HYZAAR) 50-12.5 mg per tablet Take 1 tablet by mouth once daily.    metFORMIN (GLUCOPHAGE) 500 MG tablet Take 500 mg by mouth 2 (two) times daily with meals.    ranitidine (ZANTAC) 300 MG tablet Take 300 mg by mouth every evening.     Family History     None        Tobacco Use    Smoking status: Never Smoker    Smokeless tobacco: Never Used   Substance and Sexual Activity    Alcohol use: No     Frequency: Never    Drug use: No    Sexual activity: Not on file     Review of Systems   Unable to perform ROS: Other (language barrier, pt drowsy)   Constitutional: Negative for fever.   Respiratory: Negative for shortness of breath.    Musculoskeletal: Positive for gait problem.   Psychiatric/Behavioral: Negative for confusion.     Objective:     Vital Signs (Most Recent):  Temp: 98 °F (36.7 °C) (11/26/18 1355)  Pulse: 77 (11/26/18 1659)  Resp: 16 (11/26/18 1659)  BP: (!) 150/67 (11/26/18 1659)  SpO2: 100 % (11/26/18 1659) Vital Signs (24h Range):  Temp:  [98 °F (36.7 °C)] 98 °F (36.7 °C)  Pulse:  [64-77] 77  Resp:  [16] 16  SpO2:  [100 %] 100 %  BP: (150-183)/(67-88) 150/67     Weight: 70 kg (154 lb 5.2 oz)  Body mass index is 27.34 kg/m².    Physical Exam   Constitutional: She appears well-developed and well-nourished. No distress.   HENT:   Head: Normocephalic and atraumatic.   Eyes: EOM are normal. Pupils are equal, round, and reactive to light.   Cardiovascular: Normal rate and regular rhythm.   Pulmonary/Chest: Effort normal and breath sounds normal.   CTA anteriorly   Abdominal: Soft. Bowel sounds are normal.   Musculoskeletal: She exhibits tenderness and deformity.   L leg shortened and externally rotated  Pulses and sensation intact L foot   Neurological: She is alert. No cranial nerve deficit or sensory  deficit. She exhibits normal muscle tone.   Skin: Skin is warm and dry. She is not diaphoretic.   Psychiatric: She has a normal mood and affect. Her behavior is normal.         CRANIAL NERVES     CN III, IV, VI   Pupils are equal, round, and reactive to light.  Extraocular motions are normal.        Significant Labs:   BMP:   Recent Labs   Lab 11/26/18  1606   *      K 3.7   CL 99   CO2 28   BUN 12   CREATININE 0.6   CALCIUM 9.7     CBC:   Recent Labs   Lab 11/26/18  1606   WBC 15.16*   HGB 11.1*   HCT 36.0*        Coagulation:   Recent Labs   Lab 11/26/18  1606   INR 1.0     Urine Studies:   Recent Labs   Lab 11/26/18  1606   COLORU Yellow   APPEARANCEUA Clear   PHUR 7.0   SPECGRAV 1.020   PROTEINUA Negative   GLUCUA Negative   KETONESU Negative   BILIRUBINUA Negative   OCCULTUA Negative   NITRITE Negative   UROBILINOGEN Negative   LEUKOCYTESUR Negative       Significant Imaging: I have reviewed all pertinent imaging results/findings within the past 24 hours.   Imaging Results          X-Ray Chest 1 View (Final result)  Result time 11/26/18 15:43:05    Final result by Doug Greer MD (11/26/18 15:43:05)                 Impression:      As above.      Electronically signed by: Doug Greer MD  Date:    11/26/2018  Time:    15:43             Narrative:    EXAMINATION:  XR CHEST 1 VIEW    CLINICAL HISTORY:  hip fracture;    TECHNIQUE:  Single frontal view of the chest was performed.    COMPARISON:  None    FINDINGS:  Patient is slightly rotated.  Cardiac silhouette is enlarged with bilateral mild diffuse nonspecific interstitial coarsening which may represent mild pulmonary edema/CHF pattern, with interstitial pneumonia or chronic interstitial lung changes not excluded.  No focal consolidation, pleural effusion or pneumothorax.  Calcific atherosclerosis with tortuosity of the thoracic aorta.  Thickened right paratracheal stripe, commonly secondary to ectatic vasculature in this age group.  No  acute osseous process seen.  PA and lateral views can be obtained.                               X-Ray Femur Ap/Lat Left (Final result)     Abnormal  Result time 11/26/18 15:02:17    Final result by Amaya Morrison MD (11/26/18 15:02:17)                 Impression:      Comminuted intertrochanteric left femur fracture.    This report was flagged in Epic as abnormal.      Electronically signed by: Amaya Morrison  Date:    11/26/2018  Time:    15:02             Narrative:    EXAMINATION:  XR FEMUR 2 VIEW LEFT; XR HIP 2 VIEW LEFT    CLINICAL HISTORY:  fall;Unspecified fall, initial encounter    TECHNIQUE:  Five views of the left femur were obtained.  Two views of the left hip were obtained.    COMPARISON:  None}    FINDINGS:  Comminuted intertrochanteric fracture with mild angulation and medial displacement of the lesser trochanter.    The femoral heads remain well seated within the acetabula.  The sacroiliac joints are symmetric.  Pubic symphysis is not widened.  No aggressive osseous abnormality.                               X-Ray Hip 2 View Left (Final result)     Abnormal  Result time 11/26/18 15:02:17    Final result by Amaya Morrison MD (11/26/18 15:02:17)                 Impression:      Comminuted intertrochanteric left femur fracture.    This report was flagged in Epic as abnormal.      Electronically signed by: Amaya Morrison  Date:    11/26/2018  Time:    15:02             Narrative:    EXAMINATION:  XR FEMUR 2 VIEW LEFT; XR HIP 2 VIEW LEFT    CLINICAL HISTORY:  fall;Unspecified fall, initial encounter    TECHNIQUE:  Five views of the left femur were obtained.  Two views of the left hip were obtained.    COMPARISON:  None}    FINDINGS:  Comminuted intertrochanteric fracture with mild angulation and medial displacement of the lesser trochanter.    The femoral heads remain well seated within the acetabula.  The sacroiliac joints are symmetric.  Pubic symphysis is not widened.  No aggressive osseous abnormality.                                   Assessment/Plan:     Leukocytosis    WBC mildly elevated at 15.16. UA negative, CXR showing a mild pulmonary edema pattern. Afebrile, HR normal, BP moderately elevated.   -Trend WBC     HLD (hyperlipidemia)    -Continue home statin     GERD (gastroesophageal reflux disease)    No complaints or epigastric tenderness  -Give famotidine for home ranitidine       Type 2 diabetes mellitus    Pt takes metformin only  -Monitor BG AC and HS and use SSI  -Diabetic diet post op  -Check A1C     Essential hypertension    Pt takes losartan-hctz  -Continue home regimen  -Monitor     Closed displaced intertrochanteric fracture of left femur    Pt with fall from standing d/t being off balance. XR showing L comminuted intertrochanteric fracture. Pt active and independent prior to fall.   -Attempt to clarify story when patient is no longer so drowsy  -Ortho consult, plan for OR 11/26  -Pain control and bowel regimen  -PT/OT       VTE Risk Mitigation (From admission, onward)    None             DEBBIE BonnerC  Department of Hospital Medicine   [facility]

## 2018-11-27 PROBLEM — E78.5 HLD (HYPERLIPIDEMIA): Chronic | Status: ACTIVE | Noted: 2018-11-26

## 2018-11-27 PROBLEM — I10 ESSENTIAL HYPERTENSION: Chronic | Status: ACTIVE | Noted: 2018-11-26

## 2018-11-27 PROBLEM — E11.9 TYPE 2 DIABETES MELLITUS WITHOUT COMPLICATION, WITHOUT LONG-TERM CURRENT USE OF INSULIN: Chronic | Status: ACTIVE | Noted: 2018-11-26

## 2018-11-27 PROBLEM — D62 POSTOPERATIVE ANEMIA DUE TO ACUTE BLOOD LOSS: Status: ACTIVE | Noted: 2018-11-27

## 2018-11-27 PROBLEM — D72.829 LEUKOCYTOSIS: Status: RESOLVED | Noted: 2018-11-26 | Resolved: 2018-11-27

## 2018-11-27 LAB
ANION GAP SERPL CALC-SCNC: 6 MMOL/L
BASOPHILS # BLD AUTO: 0 K/UL
BASOPHILS NFR BLD: 0 %
BLD PROD TYP BPU: NORMAL
BLOOD UNIT EXPIRATION DATE: NORMAL
BLOOD UNIT TYPE CODE: 8400
BLOOD UNIT TYPE: NORMAL
BUN SERPL-MCNC: 11 MG/DL
CALCIUM SERPL-MCNC: 8.8 MG/DL
CHLORIDE SERPL-SCNC: 99 MMOL/L
CO2 SERPL-SCNC: 30 MMOL/L
CODING SYSTEM: NORMAL
CREAT SERPL-MCNC: 0.6 MG/DL
DIFFERENTIAL METHOD: ABNORMAL
DISPENSE STATUS: NORMAL
EOSINOPHIL # BLD AUTO: 0 K/UL
EOSINOPHIL NFR BLD: 0 %
ERYTHROCYTE [DISTWIDTH] IN BLOOD BY AUTOMATED COUNT: 12.5 %
EST. GFR  (AFRICAN AMERICAN): >60 ML/MIN/1.73 M^2
EST. GFR  (NON AFRICAN AMERICAN): >60 ML/MIN/1.73 M^2
GLUCOSE SERPL-MCNC: 167 MG/DL
HCT VFR BLD AUTO: 23.6 %
HCT VFR BLD AUTO: 24.9 %
HGB BLD-MCNC: 7.3 G/DL
HGB BLD-MCNC: 7.7 G/DL
LYMPHOCYTES # BLD AUTO: 1.2 K/UL
LYMPHOCYTES NFR BLD: 10.8 %
MCH RBC QN AUTO: 27.2 PG
MCHC RBC AUTO-ENTMCNC: 30.9 G/DL
MCV RBC AUTO: 88 FL
MONOCYTES # BLD AUTO: 0.8 K/UL
MONOCYTES NFR BLD: 6.9 %
NEUTROPHILS # BLD AUTO: 9.2 K/UL
NEUTROPHILS NFR BLD: 82.1 %
PLATELET # BLD AUTO: 203 K/UL
PMV BLD AUTO: 9.3 FL
POCT GLUCOSE: 151 MG/DL (ref 70–110)
POCT GLUCOSE: 177 MG/DL (ref 70–110)
POCT GLUCOSE: 199 MG/DL (ref 70–110)
POCT GLUCOSE: 237 MG/DL (ref 70–110)
POTASSIUM SERPL-SCNC: 4.3 MMOL/L
RBC # BLD AUTO: 2.83 M/UL
SODIUM SERPL-SCNC: 135 MMOL/L
TRANS ERYTHROCYTES VOL PATIENT: NORMAL ML
WBC # BLD AUTO: 11.15 K/UL

## 2018-11-27 PROCEDURE — 36430 TRANSFUSION BLD/BLD COMPNT: CPT

## 2018-11-27 PROCEDURE — 85018 HEMOGLOBIN: CPT

## 2018-11-27 PROCEDURE — P9021 RED BLOOD CELLS UNIT: HCPCS

## 2018-11-27 PROCEDURE — 11000001 HC ACUTE MED/SURG PRIVATE ROOM

## 2018-11-27 PROCEDURE — 97530 THERAPEUTIC ACTIVITIES: CPT

## 2018-11-27 PROCEDURE — 63600175 PHARM REV CODE 636 W HCPCS: Performed by: ORTHOPAEDIC SURGERY

## 2018-11-27 PROCEDURE — G8979 MOBILITY GOAL STATUS: HCPCS | Mod: CK

## 2018-11-27 PROCEDURE — 85025 COMPLETE CBC W/AUTO DIFF WBC: CPT

## 2018-11-27 PROCEDURE — 94761 N-INVAS EAR/PLS OXIMETRY MLT: CPT

## 2018-11-27 PROCEDURE — 25000003 PHARM REV CODE 250: Performed by: ORTHOPAEDIC SURGERY

## 2018-11-27 PROCEDURE — 97165 OT EVAL LOW COMPLEX 30 MIN: CPT

## 2018-11-27 PROCEDURE — 63600175 PHARM REV CODE 636 W HCPCS: Performed by: PHYSICIAN ASSISTANT

## 2018-11-27 PROCEDURE — 97110 THERAPEUTIC EXERCISES: CPT

## 2018-11-27 PROCEDURE — 97161 PT EVAL LOW COMPLEX 20 MIN: CPT

## 2018-11-27 PROCEDURE — 25000003 PHARM REV CODE 250: Performed by: PHYSICIAN ASSISTANT

## 2018-11-27 PROCEDURE — G8978 MOBILITY CURRENT STATUS: HCPCS | Mod: CL

## 2018-11-27 PROCEDURE — 80048 BASIC METABOLIC PNL TOTAL CA: CPT

## 2018-11-27 PROCEDURE — 27000221 HC OXYGEN, UP TO 24 HOURS

## 2018-11-27 PROCEDURE — 36415 COLL VENOUS BLD VENIPUNCTURE: CPT

## 2018-11-27 PROCEDURE — 85014 HEMATOCRIT: CPT

## 2018-11-27 RX ORDER — FERROUS GLUCONATE 324(38)MG
324 TABLET ORAL
Status: DISCONTINUED | OUTPATIENT
Start: 2018-11-28 | End: 2018-11-29 | Stop reason: HOSPADM

## 2018-11-27 RX ORDER — ENOXAPARIN SODIUM 100 MG/ML
40 INJECTION SUBCUTANEOUS EVERY 24 HOURS
Status: DISCONTINUED | OUTPATIENT
Start: 2018-11-28 | End: 2018-11-29 | Stop reason: HOSPADM

## 2018-11-27 RX ORDER — HYDROCODONE BITARTRATE AND ACETAMINOPHEN 500; 5 MG/1; MG/1
TABLET ORAL
Status: DISCONTINUED | OUTPATIENT
Start: 2018-11-27 | End: 2018-11-29 | Stop reason: HOSPADM

## 2018-11-27 RX ORDER — HYDROCODONE BITARTRATE AND ACETAMINOPHEN 500; 5 MG/1; MG/1
TABLET ORAL
Status: DISCONTINUED | OUTPATIENT
Start: 2018-11-27 | End: 2018-11-27

## 2018-11-27 RX ADMIN — CEFAZOLIN SODIUM 1 G: 1 SOLUTION INTRAVENOUS at 08:11

## 2018-11-27 RX ADMIN — ATORVASTATIN CALCIUM 10 MG: 10 TABLET, FILM COATED ORAL at 09:11

## 2018-11-27 RX ADMIN — CEFAZOLIN SODIUM 1 G: 1 SOLUTION INTRAVENOUS at 01:11

## 2018-11-27 RX ADMIN — FAMOTIDINE 20 MG: 20 TABLET ORAL at 08:11

## 2018-11-27 RX ADMIN — INSULIN ASPART 2 UNITS: 100 INJECTION, SOLUTION INTRAVENOUS; SUBCUTANEOUS at 12:11

## 2018-11-27 RX ADMIN — ASPIRIN 81 MG: 81 TABLET, COATED ORAL at 09:11

## 2018-11-27 RX ADMIN — HYDROCHLOROTHIAZIDE 12.5 MG: 12.5 TABLET ORAL at 10:11

## 2018-11-27 RX ADMIN — OXYCODONE HYDROCHLORIDE AND ACETAMINOPHEN 1 TABLET: 7.5; 325 TABLET ORAL at 10:11

## 2018-11-27 RX ADMIN — HYDROCODONE BITARTRATE AND ACETAMINOPHEN 1 TABLET: 5; 325 TABLET ORAL at 04:11

## 2018-11-27 RX ADMIN — POTASSIUM CHLORIDE AND SODIUM CHLORIDE: 900; 150 INJECTION, SOLUTION INTRAVENOUS at 10:11

## 2018-11-27 RX ADMIN — CEFAZOLIN SODIUM 1 G: 1 SOLUTION INTRAVENOUS at 10:11

## 2018-11-27 RX ADMIN — POTASSIUM CHLORIDE AND SODIUM CHLORIDE: 900; 150 INJECTION, SOLUTION INTRAVENOUS at 08:11

## 2018-11-27 RX ADMIN — ENOXAPARIN SODIUM 30 MG: 100 INJECTION SUBCUTANEOUS at 09:11

## 2018-11-27 RX ADMIN — STANDARDIZED SENNA CONCENTRATE AND DOCUSATE SODIUM 1 TABLET: 8.6; 5 TABLET, FILM COATED ORAL at 08:11

## 2018-11-27 RX ADMIN — HYDROCODONE BITARTRATE AND ACETAMINOPHEN 1 TABLET: 5; 325 TABLET ORAL at 01:11

## 2018-11-27 RX ADMIN — OXYCODONE HYDROCHLORIDE AND ACETAMINOPHEN 1 TABLET: 7.5; 325 TABLET ORAL at 05:11

## 2018-11-27 RX ADMIN — FAMOTIDINE 20 MG: 20 TABLET ORAL at 09:11

## 2018-11-27 RX ADMIN — LOSARTAN POTASSIUM 50 MG: 50 TABLET, FILM COATED ORAL at 10:11

## 2018-11-27 RX ADMIN — OXYCODONE HYDROCHLORIDE AND ACETAMINOPHEN 1 TABLET: 7.5; 325 TABLET ORAL at 01:11

## 2018-11-27 NOTE — PLAN OF CARE
TN met with pt and son in law Jersey Chairez -     pt is visiting from [place]   s/p l femur fx - s/p orif 11/26 - Dr. Guthrie      independent prior to admit -- no hh, no dme      per son in  - open to SNF plcm[facility] - prefers [facility]  -- then any available facility.     TN sent referral to [facility] and [facility] facilities as well as [facility] and [facility].  Rehab.         11/27/18 2777   Discharge Assessment   Assessment Type Discharge Planning Assessment   Confirmed/corrected address and phone number on facesheet? Yes   Assessment information obtained from? Caregiver;Medical Record   Expected Length of Stay (days) 3   Communicated expected length of stay with patient/caregiver yes   Prior to hospitilization cognitive status: Alert/Oriented   Prior to hospitalization functional status: Independent   Current cognitive status: Alert/Oriented   Current Functional Status: Independent   Lives With child(eric), adult   Able to Return to Prior Arrangements unable to determine at this time (comments)   Is patient able to care for self after discharge? Yes   Who are your caregiver(s) and their phone number(s)? (son in law Jersey Chairez      )   Readmission Within The Last 30 Days no previous admission in last 30 days   Patient currently being followed by outpatient case management? No   Patient currently receives any other outside agency services? No   Equipment Currently Used at Home none   Do you have any problems affording any of your prescribed medications? TBD   Is the patient taking medications as prescribed? yes   Does the patient have transportation home? Yes   Transportation Available family or friend will provide   Does the patient receive services at the [facility]? No   Discharge Plan A Home;Home with family   Patient/Family In Agreement With Plan yes

## 2018-11-27 NOTE — HOSPITAL COURSE
Pt s/p left femur ORIF (POD #3), doing well. She reports minimal pain, tolerating PT. Neurovascular intact. Post op Hbg 7.7>7.3>8.4, improved s/p 1 unit PRBC. VSS. Pt with temp 101 overnight, likely reactive no indication of acute infection. No leukocytosis, surgical site clean/dry/intact with no signs of infection. CXR negative. UA pending on discharge. Pt denies urinary symptoms. Pt medically stable for transfer to inpatient rehab.

## 2018-11-27 NOTE — PROGRESS NOTES
Pharmacist Renal Dose Adjustment Note    Luda Carranza is a 78 y.o. female being treated with the medication Lovenox    Patient Data:    Vital Signs (Most Recent):  Temp: 98.1 °F (36.7 °C) (11/27/18 1119)  Pulse: 87 (11/27/18 1119)  Resp: 16 (11/27/18 1119)  BP: (!) 98/48 (11/27/18 1119)  SpO2: (!) 93 % (11/27/18 1216)   Vital Signs (72h Range):  Temp:  [98 °F (36.7 °C)-98.8 °F (37.1 °C)]   Pulse:  []   Resp:  [16-20]   BP: ()/(47-88)   SpO2:  [91 %-100 %]      Recent Labs   Lab 11/26/18  1606 11/27/18  0530   CREATININE 0.6 0.6     Serum creatinine: 0.6 mg/dL 11/27/18 0530  Estimated creatinine clearance: 78.1 mL/min    Medication: Lovenox dose: 30mg frequency: Q24Hr will be changed to medication: Lovenox dose: 40mg frequency: Q24Hr    Pharmacist's Name: Nazanin Rodrigues  Pharmacist's Extension: [phone]

## 2018-11-27 NOTE — PT/OT/SLP EVAL
Physical Therapy Evaluation and Treatment    Patient Name:  Luda Carranza   MRN:  [id number]    Recommendations:     Discharge Recommendations:  nursing facility, skilled   Discharge Equipment Recommendations: (TBD)   Barriers to discharge: increased need for physical assist and limited activity tolerance    Assessment:     Luda Carranza is a 78 y.o. female admitted with a medical diagnosis of Closed displaced intertrochanteric fracture of left femur.  She presents with the following impairments/functional limitations:  weakness, impaired endurance, impaired self care skills, impaired functional mobilty, gait instability, impaired balance, decreased lower extremity function, pain, decreased ROM, decreased safety awareness, edema. Pt with limited standing and gait tolerance this date as she was unable to take steps forward during AM session. Recommending SNF placement upon d/c at this time.    Rehab Prognosis: Good; patient would benefit from acute skilled PT services to address these deficits and reach maximum level of function.      Recent Surgery: Procedure(s) (LRB):  ORIF, FRACTURE, FEMUR, INTERTROCHANTERIC (Left) 1 Day Post-Op    Plan:     During this hospitalization, patient to be seen BID to address the above listed problems via gait training, therapeutic activities, therapeutic exercises  · Plan of Care Expires:  12/27/18   Plan of Care Reviewed with: patient    Subjective     Communicated with NAVARRO Finnegan prior to session.  Patient found supine with HOB elevated upon PT entry to room, agreeable to evaluation.      Pt understands little English and primarily speaks Telegu. Language line utilized throughout session.    Chief Complaint: L hip pain  Patient comments/goals: pt reporting initially that she cannot stand because her L hip/thigh hurts a lot  Pain/Comfort:  · Pain Rating 1: (reports pain in L hip throughout session but did not rate pain)  · Location - Side 1: Left  · Location - Orientation 1:  generalized  · Location 1: hip  · Pain Addressed 1: Reposition, Distraction, Cessation of Activity, Nurse notified    Patients cultural, spiritual, Presybeterian conflicts given the current situation: none reported    Living Environment:  Pt lives in [place] but is in town visiting her family. Pt is staying with her daughter and son-in-law who live in a [facility] with 2 [place].   Prior to admission, patients level of function was independent without AD for mobility and ADLs.  Patient has the following equipment: none.  DME owned (not currently used): single point cane.  Upon discharge, patient will have assistance from her family-- pt reporting her daughter fell and broke her hip 10 days ago and that her son-in-law and granddaughter will be assisting as needed upon d/c.    Objective:     Patient found with: bed alarm, peripheral IV     General Precautions: Standard, fall   Orthopedic Precautions:LLE weight bearing as tolerated   Braces: N/A     Exams:  · Postural Exam:  Patient presented with the following abnormalities:    · -       Rounded shoulders  · Skin Integrity/Edema:      · -       Skin integrity: surgical incision L hip  · RLE ROM: not formally assessed but appears WFLs  · RLE Strength: not formally assessed but appears WFLs  · LLE ROM: WFL except L knee ext lackikng ~10 deg and hip ROM limited by pain  · LLE Strength: grossly 3-/5    Functional Mobility:  · Bed Mobility:     · Scooting: minimum assistance and use of UEs on bedrail to scoot up towards HOB  · Supine to Sit: maximum assistance  · Sit to Supine: maximum assistance   · Transfers:     · Sit to Stand: minimum assistance with rolling walker  · Gait: 1 side step right with RW and max A    AM-PAC 6 CLICK MOBILITY  Total Score:11       Therapeutic Activities and Exercises:  AM session:  Educated on LE supine exercises: APs, heel slides (AAROM LLE), and QS.  Sat EOB and completed 5 reps L LAQs.  Completed 1 stand as reported above with max cues and demo for  upright posture, use of UEs on RW to off-weight LLE.  Completed weight shifting and step in place.  Returned to supine.    PM session:   RN pre-medicated pt with pain medication prior to session. L hip pain was 4/10 at start of session and 5/10 at end of session.  Pt's son-in-law present and provided translation during session. Offered language line free of charge but deferred.  BLE supine exercises x 10 reps: APs, heel slides (LLE AAROM), hip abduction slides (LLE AAROM).  Sat EOB and presented with increased posterior leaning and required min A initially then progressed to close SBA.  Completed 5 reps LLE LAQs with AAROM.  Completed 1 stand and side stepping 2-3 minimal steps, scooting RLE and unable to raise R foot off the floor.  Pt c/o dizziness and returned to supine with improved symptoms.  Scooted self towards HOB with use of UEs on bed rail with PT maintaining RLE position on bed and pt required increased time and effort to complete.    Patient left HOB elevated with all lines intact, call button in reach, bed alarm on, RN notified and pt's son-in-law present.    GOALS:   Multidisciplinary Problems     Physical Therapy Goals        Problem: Physical Therapy Goal    Goal Priority Disciplines Outcome Goal Variances Interventions   Physical Therapy Goal     PT, PT/OT Ongoing (interventions implemented as appropriate)     Description:  Goals to be met by: 18     Patient will increase functional independence with mobility by performin. Supine <> sit with MInimal Assistance  2. Sit to stand transfer with Contact Guard Assistance  3. Bed to chair transfer with Contact Guard Assistance using Rolling Walker  4. Gait  x 50 feet with Contact Guard Assistance using Rolling Walker.   5. Lower extremity exercise program x 10 reps per handout, with assistance as needed                      History:     Past Medical History:   Diagnosis Date    Diabetes mellitus     GERD (gastroesophageal reflux disease)      Hypercholesteremia        Past Surgical History:   Procedure Laterality Date    OPEN REDUCTION AND INTERNAL FIXATION (ORIF) OF INTERTROCHANTERIC FRACTURE OF FEMUR Left 11/26/2018    Procedure: ORIF, FRACTURE, FEMUR, INTERTROCHANTERIC;  Surgeon: Keshav Guthrie Jr., MD;  Location: [facility];  Service: Orthopedics;  Laterality: Left;  need synthes TFN nail  c arm    ORIF FEMUR FRACTURE Left 11/26/2018    ORIF, FRACTURE, FEMUR, INTERTROCHANTERIC Left 11/26/2018    Performed by Keshav Guthrie Jr., MD at [facility]       Clinical Decision Making:     History  Co-morbidities and personal factors that may impact the plan of care Examination  Body Structures and Functions, activity limitations and participation restrictions that may impact the plan of care Clinical Presentation   Decision Making/ Complexity Score   Co-morbidities:   [] Time since onset of injury / illness / exacerbation  [x] Status of current condition  []Patient's cognitive status and safety concerns    [] Multiple Medical Problems (see med hx)  Personal Factors:   [] Patient's age  [] Prior Level of function   [] Patient's home situation (environment and family support)  [] Patient's level of motivation  [] Expected progression of patient      HISTORY:(criteria)    [] 99698 - no personal factors/history    [x] 54033 - has 1-2 personal factor/comorbidity     [] 47230 - has >3 personal factor/comorbidity     Body Regions:  [] Objective examination findings  [] Head     []  Neck  [] Trunk   [] Upper Extremity  [x] Lower Extremity    Body Systems:  [] For communication ability, affect, cognition, language, and learning style: the assessment of the ability to make needs known, consciousness, orientation (person, place, and time), expected emotional /behavioral responses, and learning preferences (eg, learning barriers, education  needs)  [x] For the neuromuscular system: a general assessment of gross coordinated movement (eg, balance, gait, locomotion,  transfers, and transitions) and motor function  (motor control and motor learning)  [x] For the musculoskeletal system: the assessment of gross symmetry, gross range of motion, gross strength, height, and weight  [] For the integumentary system: the assessment of pliability(texture), presence of scar formation, skin color, and skin integrity  [] For cardiovascular/pulmonary system: the assessment of heart rate, respiratory rate, blood pressure, and edema     Activity limitations:    [] Patient's cognitive status and saf ety concerns          [] Status of current condition      [] Weight bearing restriction  [] Cardiopulmunary Restriction    Participation Restrictions:   [] Goals and goal agreement with the patient     [] Rehab potential (prognosis) and probable outcome      Examination of Body System: (criteria)    [x] 10482 - addressing 1-2 elements    [] 15956 - addressing a total of 3 or more elements     [] 10845 -  Addressing a total of 4 or more elements         Clinical Presentation: (criteria)  Stable - 49302     On examination of body system using standardized tests and measures patient presents with 1-2 elements from any of the following: body structures and functions, activity limitations, and/or participation restrictions.  Leading to a clinical presentation that is considered stable and/or uncomplicated                              Clinical Decision Making  (Eval Complexity):  Low- 28257     Time Tracking:     PT Received On: 11/27/18  PT Start Time: 0858     PT Stop Time: 0928        PM session: 7664-4296  PT Total Time (min): 30 min +32 min (AM session with OT)    Billable Minutes: Evaluation 15, Therapeutic Activity 15 and Therapeutic Exercise 17      Kylah Willson, PT  11/27/2018

## 2018-11-27 NOTE — PLAN OF CARE
"Patient drowsy. VSS. When asked about pain patient states " a little bit". Medicated for pain per prn orders. Dr. Preet Guajardo to release patient from PACU.  Report to Ruth BRICEÑO with time for questions, verbalized understanding. Patients family updated on room assignment. Patient discharged to floor in bed.   "

## 2018-11-27 NOTE — PT/OT/SLP EVAL
"Occupational Therapy   Evaluation    Name: Luda Carranza  MRN: [id number]  Admitting Diagnosis:  Closed displaced intertrochanteric fracture of left femur 1 Day Post-Op    Recommendations:     Discharge Recommendations: nursing facility, skilled  Discharge Equipment Recommendations:  walker, rolling, bedside commode, bath bench  Barriers to discharge:  Decreased caregiver support    History:     Occupational Profile: Language line utilized throughout session; pt speaks Bengali   Living Environment: Pt currently visiting from [facility] and staying with dghtr, JYOTI and granddghtr in 2 story home, 2 steps to enter, no rail  Previous level of function: pt reports (I); has a SPC but was not using   Equipment Used at Home:  none  Assistance upon Discharge: reports from JYOTI and granddghtr; states her dghtr recently broke her leg as well     Past Medical History:   Diagnosis Date    Diabetes mellitus     GERD (gastroesophageal reflux disease)     Hypercholesteremia        Past Surgical History:   Procedure Laterality Date    OPEN REDUCTION AND INTERNAL FIXATION (ORIF) OF INTERTROCHANTERIC FRACTURE OF FEMUR Left 11/26/2018    Procedure: ORIF, FRACTURE, FEMUR, INTERTROCHANTERIC;  Surgeon: Keshav Guthrie Jr., MD;  Location: [facility] OR;  Service: Orthopedics;  Laterality: Left;  need synthes TFN nail  c arm    ORIF FEMUR FRACTURE Left 11/26/2018    ORIF, FRACTURE, FEMUR, INTERTROCHANTERIC Left 11/26/2018    Performed by Keshav Guthrie Jr., MD at [facility] OR       Subjective     Chief Complaint: pt reporting increased pain in L hip. States, "I can't walk." Encouraged to participate with 2 therapist   Patient/Family Comments/goals: return to PLOF     Pain/Comfort:  · Pain Rating 1: (reports L hip pain throughout session, however, did not rate )  · Location - Side 1: Left  · Location - Orientation 1: generalized  · Location 1: hip  · Pain Addressed 1: Reposition, Nurse notified, Distraction, Cessation of Activity    Patients cultural, " spiritual, Zoroastrianism conflicts given the current situation:      Objective:     Communicated with: shelly prior to session.      General Precautions: Standard, fall   Orthopedic Precautions:LLE weight bearing as tolerated   Braces: N/A     Occupational Performance:    Bed Mobility:    · Patient completed Scooting/Bridging with contact guard assistance  · Patient completed Supine to Sit with moderate assistance  · Patient completed Sit to Supine with maximal assistance    Functional Mobility/Transfers:  · Patient completed Sit <> Stand Transfer with minimum assistance  with  rolling walker   · Functional Mobility: Max A lateral steps to HOB; unable to step away from EOB     Activities of Daily Living:  · Lower Body Dressing: total assistance     · Toileting: total assistance juana     Cognitive/Visual Perceptual:  Cognitive/Psychosocial Skills:     -       Oriented to: Person, Place, Time and Situation   -       Follows Commands/attention:Follows multistep  commands  -       Communication: clear/fluent  -       Memory: No Deficits noted  -       Safety awareness/insight to disability: intact   -       Mood/Affect/Coping skills/emotional control: Appropriate to situation    Physical Exam:  Balance:    -       SBA sitting balance EOB- limited dynamic at this time 2/2 pain; Max A dynamic standing   Postural examination/scapula alignment:    -       Rounded shoulders  Skin integrity: Wound surgical L hip   Edema:  Mild surgical L hip   Dominant hand:    -       right  Upper Extremity Range of Motion:   BUE ROM WFL   Upper Extremity Strength:  BUE strength WFL based on observed pt function   Strength:  Good   Fine Motor Coordination:    -       Intact    AMPAC 6 Click ADL:  AMPAC Total Score: 16    Treatment & Education:  Language line utilized throughout session   Pt reporting increased pain in L hip at this time   Educated on impt of OOB axs   Bed mobility as above   Functional stand Min A, however, pt with limited  "tolerance for WB through LLE   Education on use of BUEs to decrease WB'ing through surgical extremity, however, pt demonstrates poor follow through at this time with use of RW   LLE buckling when attempting to WB for functional steps; lateral steps to HOB performed max A with mostly "slide" stepping with RLE   Educated pt on bed level UE/LE therex   Education:    Patient left supine with all lines intact, call button in reach, bed alarm on and nsg notified    Assessment:     Luda Carranza is a 78 y.o. female with a medical diagnosis of Closed displaced intertrochanteric fracture of left femur.  She presents with the following performance deficits affecting function: weakness, impaired self care skills, impaired balance, impaired functional mobilty, impaired endurance, gait instability, decreased lower extremity function, pain, orthopedic precautions, impaired skin, decreased ROM.  Language line utilized throughout session. Pt would benefit from cont OT services in order to maximize functional independence. Recommending SNF currently at this time based on pt's current level of performance. Pt with increased pain and limited tolerance/abilities for ambulation and OOB axs this AM. Will cont to progress pt as able.     Rehab Prognosis: Good; patient would benefit from acute skilled OT services to address these deficits and reach maximum level of function.         Clinical Decision Makin.  OT Low:  "Pt evaluation falls under low complexity for evaluation coding due to performance deficits noted in 1-3 areas as stated above and 0 co-morbities affecting current functional status. Data obtained from problem focused assessments. No modifications or assistance was required for completion of evaluation. Only brief occupational profile and history review completed."     Plan:     Patient to be seen 5 x/week to address the above listed problems via self-care/home management, therapeutic activities, therapeutic " exercises  · Plan of Care Expires: 12/27/18  · Plan of Care Reviewed with: patient    This Plan of care has been discussed with the patient who was involved in its development and understands and is in agreement with the identified goals and treatment plan    GOALS:   Multidisciplinary Problems     Occupational Therapy Goals        Problem: Occupational Therapy Goal    Goal Priority Disciplines Outcome Interventions   Occupational Therapy Goal     OT, PT/OT Ongoing (interventions implemented as appropriate)    Description:  Goals to be met by: 12/27/18     Patient will increase functional independence with ADLs by performing:    LE Dressing with Minimal Assistance.  Grooming while standing with Contact Guard Assistance.  Toileting from bedside commode with Minimal Assistance for hygiene and clothing management.   Supine to sit with Contact Guard Assistance.  Step transfer with Contact Guard Assistance  Toilet transfer to bedside commode with Contact Guard Assistance.  Upper extremity exercise program x10 reps per handout, with independence.                      Time Tracking:     OT Date of Treatment: 11/27/18  OT Start Time: 0858  OT Stop Time: 0928  OT Total Time (min): 30 min    Billable Minutes:Evaluation 10  Therapeutic Activity 20    Jillian Davison OT  11/27/2018

## 2018-11-27 NOTE — SUBJECTIVE & OBJECTIVE
Interval History: Pt lying in bed resting, reports feeling well. Son in law at bedside, discussed POC.     Review of Systems   Unable to perform ROS: Other (language barrier, pt drowsy)   Constitutional: Negative for fever.   Respiratory: Negative for chest tightness and shortness of breath.    Cardiovascular: Negative for chest pain.   Gastrointestinal: Negative for abdominal pain, nausea and vomiting.   Musculoskeletal: Positive for gait problem.   Neurological: Negative for dizziness, weakness, light-headedness and headaches.   Psychiatric/Behavioral: Negative for confusion.     Objective:     Vital Signs (Most Recent):  Temp: 98.1 °F (36.7 °C) (11/27/18 1119)  Pulse: 87 (11/27/18 1119)  Resp: 16 (11/27/18 1119)  BP: (!) 98/48 (11/27/18 1119)  SpO2: (!) 93 % (11/27/18 1216) Vital Signs (24h Range):  Temp:  [98.1 °F (36.7 °C)-98.8 °F (37.1 °C)] 98.1 °F (36.7 °C)  Pulse:  [] 87  Resp:  [16-20] 16  SpO2:  [91 %-100 %] 93 %  BP: ()/(47-72) 98/48     Weight: 76.4 kg (168 lb 6.9 oz)  Body mass index is 29.84 kg/m².    Intake/Output Summary (Last 24 hours) at 11/27/2018 1432  Last data filed at 11/27/2018 0900  Gross per 24 hour   Intake 2181.67 ml   Output 1800 ml   Net 381.67 ml      Physical Exam   Constitutional: She appears well-developed and well-nourished. No distress.   HENT:   Head: Normocephalic and atraumatic.   Eyes: EOM are normal. Pupils are equal, round, and reactive to light.   Cardiovascular: Normal rate and regular rhythm.   Pulmonary/Chest: Effort normal and breath sounds normal. No respiratory distress.   Abdominal: Soft. Bowel sounds are normal.   Musculoskeletal: She exhibits tenderness (mild tenderness to surgical site).   Pulses, sensation intact to  LLE    Neurological: She is alert. No cranial nerve deficit or sensory deficit. She exhibits normal muscle tone.   Skin: Skin is warm and dry. She is not diaphoretic.   Psychiatric: She has a normal mood and affect. Her behavior is  normal.       Significant Labs:   BMP:   Recent Labs   Lab 11/27/18  0530   *   *   K 4.3   CL 99   CO2 30*   BUN 11   CREATININE 0.6   CALCIUM 8.8     CBC:   Recent Labs   Lab 11/26/18  1606 11/27/18  0530 11/27/18  1238   WBC 15.16* 11.15  --    HGB 11.1* 7.7* 7.3*   HCT 36.0* 24.9* 23.6*    203  --        Significant Imaging: I have reviewed all pertinent imaging results/findings within the past 24 hours.

## 2018-11-27 NOTE — TRANSFER OF CARE
"Anesthesia Transfer of Care Note    Patient: Luda Carranza    Procedure(s) Performed: Procedure(s) (LRB):  ORIF, FRACTURE, FEMUR, INTERTROCHANTERIC (Left)    Patient location: PACU    Anesthesia Type: general    Transport from OR: Transported from OR on 6-10 L/min O2 by face mask with adequate spontaneous ventilation    Post pain: adequate analgesia    Post assessment: no apparent anesthetic complications and tolerated procedure well    Post vital signs: stable    Level of consciousness: awake, oriented and alert    Nausea/Vomiting: no nausea/vomiting    Complications: none    Transfer of care protocol was followed      Last vitals:   Visit Vitals  BP (!) 150/67   Pulse 77   Temp 36.7 °C (98 °F) (Oral)   Resp 16   Ht 5' 3" (1.6 m)   Wt 70 kg (154 lb 5.2 oz)   SpO2 100%   BMI 27.34 kg/m²     "

## 2018-11-27 NOTE — ANESTHESIA RELEASE NOTE
"Anesthesia Release from PACU Note    Patient: Luda Carranza    Procedure(s) Performed: Procedure(s) (LRB):  ORIF, FRACTURE, FEMUR, INTERTROCHANTERIC (Left)    Anesthesia type: general    Post pain: Adequate analgesia    Post assessment: no apparent anesthetic complications    Last Vitals:   Visit Vitals  BP (!) 148/58   Pulse 74   Temp 36.7 °C (98 °F) (Oral)   Resp 20   Ht 5' 3" (1.6 m)   Wt 70 kg (154 lb 5.2 oz)   SpO2 100%   Breastfeeding? No   BMI 27.34 kg/m²       Post vital signs: stable    Level of consciousness: awake    Nausea/Vomiting: no nausea/no vomiting    Complications: none    Airway Patency: patent    Respiratory: unassisted, spontaneous ventilation    Cardiovascular: stable and blood pressure at baseline    Hydration: euvolemic  "

## 2018-11-27 NOTE — CONSULTS
REASON FOR CONSULTATION:  Left hip fracture.    HISTORY OF PRESENT ILLNESS:  A 78-year-old female who lost her balance earlier   today, fell and landed on her left hip, seen in the Emergency Room, noted to   have a left hip fracture, intertrochanteric subtrochanteric variant, admitted   for surgery tonight.  No other injury reported.  The patient is alert and   oriented.    PAST MEDICAL HISTORY:  Significant for diabetes, GERD and hypercholesterolemia.    PAST SURGICAL HISTORY:  None listed.    FAMILY HISTORY:  Negative.    SOCIAL HISTORY:  The patient does not smoke or drink.    REVIEW OF SYSTEMS:  Negative for fever, chills, rashes.    CURRENT MEDICATIONS:  Reviewed on chart.    ALLERGIES:  None.    PHYSICAL EXAMINATION:  GENERAL:  Well-developed, well-nourished female in no acute distress.  Alert and   oriented x3.  HEENT:  Unremarkable.  LUNGS:  Clear to auscultation.  HEART:  Regular rate and rhythm.  ABDOMEN:  Soft, nontender.  EXTREMITIES:  Significant for the left hip, demonstrating swelling, tenderness,   pain with motion of left leg, shortened and externally rotated.  NEUROLOGIC:  Intact.    IMAGING STUDIES:  X-rays of AP and lateral left hip demonstrate subtrochanteric   intertrochanteric fracture of the left hip, displaced, slightly comminuted.    IMPRESSION:  Left subtrochanteric intertrochanteric fracture, comminuted.    PLAN:  The patient will be admitted for operative treatment to include IM emelina   fixation of left hip and femur.  Risks and benefits of surgery were explained to   the patient and family who signed the consent tonight.  Preop medical clearance   per medicine and hospitalist.      SANDRA/IN  dd: 11/26/2018 17:23:25 (CST)  td: 11/26/2018 18:26:22 (CST)  Doc ID   #[id number]  Job ID #[id number]    CC:

## 2018-11-27 NOTE — ASSESSMENT & PLAN NOTE
S/p ORIF left femur (POD #1)     Pt with fall from standing d/t being off balance. XR showing L comminuted intertrochanteric fracture. Pt active and independent prior to fall.     -Pain control and bowel regimen  -PT/OT

## 2018-11-27 NOTE — PROGRESS NOTES
POD 1  Doing well  AF VSS  P/E- Left hip with min drainage  NVI    HCT= 23 (7.3 )    P: PT      Follow H/H

## 2018-11-27 NOTE — PLAN OF CARE
Problem: Patient Care Overview  Goal: Plan of Care Review  Outcome: Ongoing (interventions implemented as appropriate)  Patient on oxygen with documented flow.  Will attempt to wean per O2 order protocol. No distress noted. Will continue to monitor.

## 2018-11-27 NOTE — PLAN OF CARE
Problem: Patient Care Overview  Goal: Plan of Care Review  Outcome: Ongoing (interventions implemented as appropriate)  Pt AAOx4. Pt complaints of pain to the left hip with moderate relief from PRN pain medication. Pt remains on 2L NC. IVF infusing at 100 ml/hr. IV antibiotics given per MD orders. Pt on regular diet but has not eaten anything since after surgery. Quinteros care provided. Blood glucose monitoring continued. Bed locked in lowest position, bed alarm set and call bell within reach. Pt verbalized understanding to call for any needs or assistance. Will continue to monitor.

## 2018-11-27 NOTE — BRIEF OP NOTE
[facility]  Brief Operative Note    SUMMARY     Surgery Date: 11/26/2018     Surgeon(s) and Role:     * Keshav Guthrie Jr., MD - Primary    Assisting Surgeon: None    Pre-op Diagnosis:  Fall [W19.XXXA]    Post-op Diagnosis:  Post-Op Diagnosis Codes:     * Fall [W19.XXXA]    Procedure(s) (LRB):  ORIF, FRACTURE, FEMUR, INTERTROCHANTERIC (Left)    Anesthesia: General    Description of Procedure: left hip fx    Description of the findings of the procedure: ORIF left hip fx    Estimated Blood Loss: 450 mL         Specimens:   Specimen (12h ago, onward)    None

## 2018-11-27 NOTE — PLAN OF CARE
Problem: Physical Therapy Goal  Goal: Physical Therapy Goal  Goals to be met by: 18     Patient will increase functional independence with mobility by performin. Supine <> sit with MInimal Assistance  2. Sit to stand transfer with Contact Guard Assistance  3. Bed to chair transfer with Contact Guard Assistance using Rolling Walker  4. Gait  x 50 feet with Contact Guard Assistance using Rolling Walker.   5. Lower extremity exercise program x 10 reps per handout, with assistance as needed    Outcome: Ongoing (interventions implemented as appropriate)  PT evaluation completed, note to follow. Pt with limited standing and gait tolerance this date as she was unable to take steps forward during AM session. Recommending SNF placement upon d/c at this time.

## 2018-11-27 NOTE — PLAN OF CARE
Problem: Occupational Therapy Goal  Goal: Occupational Therapy Goal  Goals to be met by: 12/27/18     Patient will increase functional independence with ADLs by performing:    LE Dressing with Minimal Assistance.  Grooming while standing with Contact Guard Assistance.  Toileting from bedside commode with Minimal Assistance for hygiene and clothing management.   Supine to sit with Contact Guard Assistance.  Step transfer with Contact Guard Assistance  Toilet transfer to bedside commode with Contact Guard Assistance.  Upper extremity exercise program x10 reps per handout, with independence.    Outcome: Ongoing (interventions implemented as appropriate)  Language line utilized throughout session. Pt would benefit from cont OT services in order to maximize functional independence. Recommending SNF currently at this time based on pt's current level of performance. Pt with increased pain and limited tolerance/abilities for ambulation and OOB axs this AM. Will cont to progress pt as able.

## 2018-11-27 NOTE — ANESTHESIA POSTPROCEDURE EVALUATION
"Anesthesia Post Evaluation    Patient: Luda Carranza    Procedure(s) Performed: Procedure(s) (LRB):  ORIF, FRACTURE, FEMUR, INTERTROCHANTERIC (Left)    Final Anesthesia Type: general  Patient location during evaluation: PACU  Patient participation: Yes- Able to Participate  Level of consciousness: awake  Post-procedure vital signs: reviewed and stable  Pain management: adequate  Airway patency: patent  PONV status at discharge: No PONV  Anesthetic complications: no      Cardiovascular status: stable  Respiratory status: unassisted  Hydration status: euvolemic  Follow-up not needed.        Visit Vitals  BP (!) 148/58   Pulse 74   Temp 36.7 °C (98 °F) (Oral)   Resp 20   Ht 5' 3" (1.6 m)   Wt 70 kg (154 lb 5.2 oz)   SpO2 100%   Breastfeeding? No   BMI 27.34 kg/m²       Pain/Layla Score: Pain Assessment Performed: Yes (11/26/2018  9:07 PM)  Presence of Pain: complains of pain/discomfort (11/26/2018  9:07 PM)  Pain Rating Prior to Med Admin: 4 (11/26/2018  8:59 PM)  Layla Score: 8 (11/26/2018  9:07 PM)        "

## 2018-11-27 NOTE — PROGRESS NOTES
"[facility] Hospital Medicine  Progress Note    Patient Name: Luda Carranza  MRN: [id number]  Patient Class: IP- Inpatient   Admission Date: 11/26/2018  Length of Stay: 1 days  Attending Physician: Uriah Perkins MD  Primary Care Provider: Primary Doctor No        Subjective:     Principal Problem:Closed displaced intertrochanteric fracture of left femur    HPI:  Ms. Luda Carranza is a 79 yo woman with HTN, HLD, GERD, and NIDDM II who presents with hip fracture. History is limited by a language barrier and her son-in-law acts as . At the time of interview pt is very drowsy from pain medication. She is visiting her family in the [place] from [place]. She is generally very independent and walks unassisted. She has occasional issues with balance. She does yoga daily. JYOTI reports that she had a fall from standing when she was walking in the kitchen and "lost balance", after which she experienced significant pain in the left hip. Her family brought her to the ED, where she was found to have a comminuted intertrochanteric left femur fracture. CXR showed diffuse nonspecific interstitial coarsening, possibly mild pulmonary edema. Vital signs were stable other than an elevated BP (-183). Labs were significant for WBC 15. UA was negative  She was admitted to hospital medicine with an ortho consult, planning for repair the evening of 11/26.     Hospital Course:  Pt s/p left femur ORIF, doing well. She reports minimal pain, tolerating PT. Neurovascular intact. Post op Hbg 7.7>7.3. Will transfuse 1 unit PRBC. SBP , hold BP meds for now.      Interval History: Pt lying in bed resting, reports feeling well. Son in law at bedside, discussed POC.     Review of Systems   Unable to perform ROS: Other (language barrier, pt drowsy)   Constitutional: Negative for fever.   Respiratory: Negative for chest tightness and shortness of breath.    Cardiovascular: Negative for chest pain.   Gastrointestinal: " Negative for abdominal pain, nausea and vomiting.   Musculoskeletal: Positive for gait problem.   Neurological: Negative for dizziness, weakness, light-headedness and headaches.   Psychiatric/Behavioral: Negative for confusion.     Objective:     Vital Signs (Most Recent):  Temp: 98.1 °F (36.7 °C) (11/27/18 1119)  Pulse: 87 (11/27/18 1119)  Resp: 16 (11/27/18 1119)  BP: (!) 98/48 (11/27/18 1119)  SpO2: (!) 93 % (11/27/18 1216) Vital Signs (24h Range):  Temp:  [98.1 °F (36.7 °C)-98.8 °F (37.1 °C)] 98.1 °F (36.7 °C)  Pulse:  [] 87  Resp:  [16-20] 16  SpO2:  [91 %-100 %] 93 %  BP: ()/(47-72) 98/48     Weight: 76.4 kg (168 lb 6.9 oz)  Body mass index is 29.84 kg/m².    Intake/Output Summary (Last 24 hours) at 11/27/2018 1432  Last data filed at 11/27/2018 0900  Gross per 24 hour   Intake 2181.67 ml   Output 1800 ml   Net 381.67 ml      Physical Exam   Constitutional: She appears well-developed and well-nourished. No distress.   HENT:   Head: Normocephalic and atraumatic.   Eyes: EOM are normal. Pupils are equal, round, and reactive to light.   Cardiovascular: Normal rate and regular rhythm.   Pulmonary/Chest: Effort normal and breath sounds normal. No respiratory distress.   Abdominal: Soft. Bowel sounds are normal.   Musculoskeletal: She exhibits tenderness (mild tenderness to surgical site).   Pulses, sensation intact to  LLE    Neurological: She is alert. No cranial nerve deficit or sensory deficit. She exhibits normal muscle tone.   Skin: Skin is warm and dry. She is not diaphoretic.   Psychiatric: She has a normal mood and affect. Her behavior is normal.       Significant Labs:   BMP:   Recent Labs   Lab 11/27/18  0530   *   *   K 4.3   CL 99   CO2 30*   BUN 11   CREATININE 0.6   CALCIUM 8.8     CBC:   Recent Labs   Lab 11/26/18  1606 11/27/18  0530 11/27/18  1238   WBC 15.16* 11.15  --    HGB 11.1* 7.7* 7.3*   HCT 36.0* 24.9* 23.6*    203  --        Significant Imaging: I have  reviewed all pertinent imaging results/findings within the past 24 hours.    Assessment/Plan:      * Closed displaced intertrochanteric fracture of left femur    S/p ORIF left femur (POD #1)     Pt with fall from standing d/t being off balance. XR showing L comminuted intertrochanteric fracture. Pt active and independent prior to fall.     -Pain control and bowel regimen  -PT/OT     Postoperative anemia due to acute blood loss    POD #1 Hbg 11>7.7>7.3     --Transfuse 1 unit PRBC   --trend hbg/hct        HLD (hyperlipidemia)    -Continue home statin     GERD (gastroesophageal reflux disease)    -Give famotidine for home ranitidine       Type 2 diabetes mellitus without complication, without long-term current use of insulin    Pt takes metformin only (A1C 6.0)   -Monitor BG AC and HS and use SSI  -Diabetic diet        Essential hypertension    Pt takes losartan-hctz, hold for now 2/2 hypotension          VTE Risk Mitigation (From admission, onward)        Ordered     enoxaparin injection 40 mg  Daily      11/27/18 1308     IP VTE HIGH RISK PATIENT  Once      11/26/18 2137     Place sequential compression device  Until discontinued      11/26/18 2137     Place JOANNA hose  Until discontinued      11/26/18 2137              Deya Alvarez NP  Department of Hospital Medicine   [facility]

## 2018-11-28 LAB
ANION GAP SERPL CALC-SCNC: 5 MMOL/L
BASOPHILS # BLD AUTO: 0.03 K/UL
BASOPHILS NFR BLD: 0.3 %
BUN SERPL-MCNC: 12 MG/DL
CALCIUM SERPL-MCNC: 8.7 MG/DL
CHLORIDE SERPL-SCNC: 102 MMOL/L
CO2 SERPL-SCNC: 31 MMOL/L
CREAT SERPL-MCNC: 0.6 MG/DL
DIFFERENTIAL METHOD: ABNORMAL
EOSINOPHIL # BLD AUTO: 0.1 K/UL
EOSINOPHIL NFR BLD: 1.3 %
ERYTHROCYTE [DISTWIDTH] IN BLOOD BY AUTOMATED COUNT: 13 %
EST. GFR  (AFRICAN AMERICAN): >60 ML/MIN/1.73 M^2
EST. GFR  (NON AFRICAN AMERICAN): >60 ML/MIN/1.73 M^2
GLUCOSE SERPL-MCNC: 158 MG/DL
HCT VFR BLD AUTO: 26.8 %
HGB BLD-MCNC: 8.4 G/DL
LYMPHOCYTES # BLD AUTO: 2.6 K/UL
LYMPHOCYTES NFR BLD: 24.1 %
MCH RBC QN AUTO: 28.2 PG
MCHC RBC AUTO-ENTMCNC: 31.3 G/DL
MCV RBC AUTO: 90 FL
MONOCYTES # BLD AUTO: 1.2 K/UL
MONOCYTES NFR BLD: 11.4 %
NEUTROPHILS # BLD AUTO: 6.8 K/UL
NEUTROPHILS NFR BLD: 62.4 %
PLATELET # BLD AUTO: 173 K/UL
PMV BLD AUTO: 9.3 FL
POCT GLUCOSE: 139 MG/DL (ref 70–110)
POCT GLUCOSE: 185 MG/DL (ref 70–110)
POCT GLUCOSE: 194 MG/DL (ref 70–110)
POCT GLUCOSE: 206 MG/DL (ref 70–110)
POTASSIUM SERPL-SCNC: 4.1 MMOL/L
RBC # BLD AUTO: 2.98 M/UL
SODIUM SERPL-SCNC: 138 MMOL/L
WBC # BLD AUTO: 10.84 K/UL

## 2018-11-28 PROCEDURE — 36415 COLL VENOUS BLD VENIPUNCTURE: CPT

## 2018-11-28 PROCEDURE — 25000003 PHARM REV CODE 250: Performed by: ORTHOPAEDIC SURGERY

## 2018-11-28 PROCEDURE — 25000003 PHARM REV CODE 250: Performed by: PHYSICIAN ASSISTANT

## 2018-11-28 PROCEDURE — 63600175 PHARM REV CODE 636 W HCPCS: Performed by: HOSPITALIST

## 2018-11-28 PROCEDURE — 85025 COMPLETE CBC W/AUTO DIFF WBC: CPT

## 2018-11-28 PROCEDURE — 97530 THERAPEUTIC ACTIVITIES: CPT

## 2018-11-28 PROCEDURE — 25000003 PHARM REV CODE 250: Performed by: HOSPITALIST

## 2018-11-28 PROCEDURE — 94761 N-INVAS EAR/PLS OXIMETRY MLT: CPT

## 2018-11-28 PROCEDURE — 97110 THERAPEUTIC EXERCISES: CPT

## 2018-11-28 PROCEDURE — 27000221 HC OXYGEN, UP TO 24 HOURS

## 2018-11-28 PROCEDURE — 80048 BASIC METABOLIC PNL TOTAL CA: CPT

## 2018-11-28 PROCEDURE — 11000001 HC ACUTE MED/SURG PRIVATE ROOM

## 2018-11-28 RX ORDER — LOSARTAN POTASSIUM 50 MG/1
50 TABLET ORAL DAILY
Status: DISCONTINUED | OUTPATIENT
Start: 2018-11-28 | End: 2018-11-29 | Stop reason: HOSPADM

## 2018-11-28 RX ORDER — HYDRALAZINE HYDROCHLORIDE 20 MG/ML
10 INJECTION INTRAMUSCULAR; INTRAVENOUS EVERY 6 HOURS PRN
Status: DISCONTINUED | OUTPATIENT
Start: 2018-11-28 | End: 2018-11-29 | Stop reason: HOSPADM

## 2018-11-28 RX ADMIN — OXYCODONE HYDROCHLORIDE AND ACETAMINOPHEN 1 TABLET: 7.5; 325 TABLET ORAL at 09:11

## 2018-11-28 RX ADMIN — ENOXAPARIN SODIUM 40 MG: 100 INJECTION SUBCUTANEOUS at 05:11

## 2018-11-28 RX ADMIN — OXYCODONE HYDROCHLORIDE AND ACETAMINOPHEN 1 TABLET: 7.5; 325 TABLET ORAL at 03:11

## 2018-11-28 RX ADMIN — ASPIRIN 81 MG: 81 TABLET, COATED ORAL at 08:11

## 2018-11-28 RX ADMIN — FERROUS GLUCONATE 324 MG: 324 TABLET ORAL at 08:11

## 2018-11-28 RX ADMIN — FAMOTIDINE 20 MG: 20 TABLET ORAL at 08:11

## 2018-11-28 RX ADMIN — ATORVASTATIN CALCIUM 10 MG: 10 TABLET, FILM COATED ORAL at 08:11

## 2018-11-28 RX ADMIN — INSULIN ASPART 2 UNITS: 100 INJECTION, SOLUTION INTRAVENOUS; SUBCUTANEOUS at 06:11

## 2018-11-28 RX ADMIN — STANDARDIZED SENNA CONCENTRATE AND DOCUSATE SODIUM 1 TABLET: 8.6; 5 TABLET, FILM COATED ORAL at 09:11

## 2018-11-28 RX ADMIN — STANDARDIZED SENNA CONCENTRATE AND DOCUSATE SODIUM 1 TABLET: 8.6; 5 TABLET, FILM COATED ORAL at 08:11

## 2018-11-28 RX ADMIN — LOSARTAN POTASSIUM 50 MG: 50 TABLET, FILM COATED ORAL at 11:11

## 2018-11-28 RX ADMIN — HYDROCODONE BITARTRATE AND ACETAMINOPHEN 1 TABLET: 5; 325 TABLET ORAL at 09:11

## 2018-11-28 RX ADMIN — FAMOTIDINE 20 MG: 20 TABLET ORAL at 09:11

## 2018-11-28 NOTE — PLAN OF CARE
Problem: Fall Risk (Adult)  Goal: Absence of Falls  Patient will demonstrate the desired outcomes by discharge/transition of care.  Outcome: Ongoing (interventions implemented as appropriate)       Problem: Infection, Risk/Actual (Adult)  Goal: Infection Prevention/Resolution  Patient will demonstrate the desired outcomes by discharge/transition of care.  Outcome: Ongoing (interventions implemented as appropriate)       Problem: Patient Care Overview  Goal: Plan of Care Review  Outcome: Ongoing (interventions implemented as appropriate)  Patient aaox4, son in law and grand daughter at bedside, patient denies pain, managed with Norco, given once. Patient worked with PT today. No falls during shift. Quinteros catheter removed today, due to void by 10 pm. No complications. 1 unit of blood given today, no reactions. VSS. Insulin given today. Will continue to monitor.     Problem: Diabetes, Type 2 (Adult)  Goal: Signs and Symptoms of Listed Potential Problems Will be Absent, Minimized or Managed (Diabetes, Type 2)  Signs and symptoms of listed potential problems will be absent, minimized or managed by discharge/transition of care (reference Diabetes, Type 2 (Adult) CPG).  Outcome: Ongoing (interventions implemented as appropriate)

## 2018-11-28 NOTE — PLAN OF CARE
"Problem: Occupational Therapy Goal  Goal: Occupational Therapy Goal  Goals to be met by: 12/27/18     Patient will increase functional independence with ADLs by performing:    LE Dressing with Minimal Assistance.  Grooming while standing with Contact Guard Assistance.  Toileting from bedside commode with Minimal Assistance for hygiene and clothing management.   Supine to sit with Contact Guard Assistance.  Step transfer with Contact Guard Assistance  Toilet transfer to bedside commode with Contact Guard Assistance.  Upper extremity exercise program x10 reps per handout, with independence.     Outcome: Ongoing (interventions implemented as appropriate)  Pt appearing with increased pain this date; requiring increased assist this date, however, reporting "weakness" as the issue and not eating much breakfast. Will cont to progress pt as able. Demonstrates good potential to return to PLOF       "

## 2018-11-28 NOTE — PROGRESS NOTES
"[facility] Hospital Medicine  Progress Note    Patient Name: Luda Carranza  MRN: [id number]  Patient Class: IP- Inpatient   Admission Date: 11/26/2018  Length of Stay: 2 days  Attending Physician: Uriah Perkins MD  Primary Care Provider: Primary Doctor No        Subjective:     Principal Problem:Closed displaced intertrochanteric fracture of left femur    HPI:  Ms. Luda Carranza is a 77 yo woman with HTN, HLD, GERD, and NIDDM II who presents with hip fracture. History is limited by a language barrier and her son-in-law acts as . At the time of interview pt is very drowsy from pain medication. She is visiting her family in the [place] from [place]. She is generally very independent and walks unassisted. She has occasional issues with balance. She does yoga daily. JYOTI reports that she had a fall from standing when she was walking in the kitchen and "lost balance", after which she experienced significant pain in the left hip. Her family brought her to the ED, where she was found to have a comminuted intertrochanteric left femur fracture. CXR showed diffuse nonspecific interstitial coarsening, possibly mild pulmonary edema. Vital signs were stable other than an elevated BP (-183). Labs were significant for WBC 15. UA was negative  She was admitted to hospital medicine with an ortho consult, planning for repair the evening of 11/26.     Hospital Course:  Pt s/p left femur ORIF (POD #2), doing well. She reports minimal pain, tolerating PT. Neurovascular intact. Post op Hbg 7.7>7.3>8.4, improved s/p 1 unit PRBC. VSS. Pt accepted to [facility]. Admission pending insurance approval.         Interval History: Pt lying in bed. In process of transferring to chair with PT. Pain controlled, no acute events overnight. No family at bedside at this time.     Review of Systems   Unable to perform ROS: Other (language barrier, pt drowsy)   Constitutional: Negative for fever.   Respiratory: Negative for " chest tightness and shortness of breath.    Cardiovascular: Negative for chest pain.   Gastrointestinal: Negative for abdominal pain, nausea and vomiting.   Musculoskeletal:        Left hip tenderness   Neurological: Negative for dizziness, weakness, light-headedness and headaches.   Psychiatric/Behavioral: Negative for confusion.     Objective:     Vital Signs (Most Recent):  Temp: 97.7 °F (36.5 °C) (11/28/18 1131)  Pulse: 89 (11/28/18 1318)  Resp: 17 (11/28/18 1318)  BP: (!) 117/56 (11/28/18 1131)  SpO2: 99 % (11/28/18 1318) Vital Signs (24h Range):  Temp:  [97.5 °F (36.4 °C)-99.3 °F (37.4 °C)] 97.7 °F (36.5 °C)  Pulse:  [78-95] 89  Resp:  [16-20] 17  SpO2:  [91 %-99 %] 99 %  BP: (117-153)/(56-68) 117/56     Weight: 76.5 kg (168 lb 10.4 oz)  Body mass index is 29.88 kg/m².    Intake/Output Summary (Last 24 hours) at 11/28/2018 1410  Last data filed at 11/28/2018 1240  Gross per 24 hour   Intake 1882.5 ml   Output 1400 ml   Net 482.5 ml      Physical Exam   Constitutional: She appears well-developed and well-nourished. No distress.   HENT:   Head: Normocephalic and atraumatic.   Eyes: EOM are normal. Pupils are equal, round, and reactive to light.   Cardiovascular: Normal rate and regular rhythm.   Pulmonary/Chest: Effort normal and breath sounds normal. No respiratory distress.   Abdominal: Soft. Bowel sounds are normal.   Musculoskeletal: She exhibits tenderness (mild tenderness to surgical site).   Pulses, sensation intact to  LLE    Neurological: She is alert. No cranial nerve deficit or sensory deficit. She exhibits normal muscle tone.   Skin: Skin is warm and dry. She is not diaphoretic.   Psychiatric: She has a normal mood and affect. Her behavior is normal.       Significant Labs:   CBC:   Recent Labs   Lab 11/26/18  1606 11/27/18  0530 11/27/18  1238 11/28/18  0545   WBC 15.16* 11.15  --  10.84   HGB 11.1* 7.7* 7.3* 8.4*   HCT 36.0* 24.9* 23.6* 26.8*    203  --  173       Significant Imaging: I have  reviewed all pertinent imaging results/findings within the past 24 hours.    Assessment/Plan:      * Closed displaced intertrochanteric fracture of left femur    S/p ORIF left femur (POD #2)     Pt with fall from standing d/t being off balance. XR showing L comminuted intertrochanteric fracture. Pt active and independent prior to fall.     -Ortho  Following   -Pain control and bowel regimen  -continue PT/OT  -admission to rehab pending insurance approval      Postoperative anemia due to acute blood loss    POD #1 Hbg 11>7.7>7.3> 8.4     --received 1 unit PRBC, hbg/hct stable          HLD (hyperlipidemia)    -Continue home statin     GERD (gastroesophageal reflux disease)    -Give famotidine for home ranitidine       Type 2 diabetes mellitus without complication, without long-term current use of insulin    Pt takes metformin only (A1C 6.0)   -Monitor BG AC and HS and use SSI  -Diabetic diet        Essential hypertension    BP stable   Pt takes losartan-hctz         VTE Risk Mitigation (From admission, onward)        Ordered     enoxaparin injection 40 mg  Daily      11/27/18 1308     IP VTE HIGH RISK PATIENT  Once      11/26/18 2137     Place sequential compression device  Until discontinued      11/26/18 2137     Place JOANNA hose  Until discontinued      11/26/18 2137              Deya Alvarez NP  Department of Hospital Medicine   [facility]

## 2018-11-28 NOTE — PT/OT/SLP PROGRESS
"Physical Therapy Treatment    Patient Name:  Luda Carranza   MRN:  [id number]    Recommendations:     Discharge Recommendations:  nursing facility, skilled   Discharge Equipment Recommendations: bedside commode, bath bench, walker, rolling   Barriers to discharge: increased need for physical assist and limited activity tolerane    Assessment:     Luda Carranza is a 78 y.o. female admitted with a medical diagnosis of Closed displaced intertrochanteric fracture of left femur.  She presents with the following impairments/functional limitations:  weakness, impaired endurance, impaired self care skills, impaired functional mobilty, gait instability, impaired balance, decreased lower extremity function, decreased safety awareness, pain, decreased ROM, impaired coordination, edema, impaired skin. Pt with increased difficulty completing mobility this date, requiring increased assist for mobility and did not tolerate forward steps. Recommending SNF placement upon d/c.    Rehab Prognosis: Good; patient would benefit from acute skilled PT services to address these deficits and reach maximum level of function.      Recent Surgery: Procedure(s) (LRB):  ORIF, FRACTURE, FEMUR, INTERTROCHANTERIC (Left) 2 Days Post-Op    Plan:     During this hospitalization, patient to be seen BID to address the above listed problems via gait training, therapeutic activities, therapeutic exercises  · Plan of Care Expires:  12/27/18   Plan of Care Reviewed with: patient    Subjective     Communicated with NAVARRO Donahue prior to session.  Patient found supine with HOB elevated upon PT entry to room, agreeable to treatment.      Pt understands some English but Telegu is her primary language. Language line utilized during session.    Chief Complaint: feeling weak because she has not eaten much yet  Patient comments/goals: when asked how her L hip feels today, pt reports "better" but appears that pt is in more pain today  Pain/Comfort:  · Pain Rating 1: " "5/10  · Location - Side 1: Left  · Location - Orientation 1: generalized  · Location 1: hip  · Pain Addressed 1: Pre-medicate for activity, Reposition, Distraction, Cessation of Activity  · Pain Rating Post-Intervention 1: 5/10    Patients cultural, spiritual, Restoration conflicts given the current situation: none reported    Objective:     Patient found with: bed alarm, peripheral IV     General Precautions: Standard, fall   Orthopedic Precautions:LLE weight bearing as tolerated   Braces: N/A     Functional Mobility:  · Bed Mobility:     · Scooting: max A to scoot sitting EOB and max A x 2 to scoot in supine  · Supine to Sit: maximal assistance and of 2 persons  · Sit to Supine: maximal assistance and of 2 persons  · Transfers:     · Sit to Stand:  maximal assistance with rolling walker  · Gait: only minimally moved toes to the left in standing this date, unable to take steps. Max cues for increased UE activation to stand upright and off-weight LLE.      AM-PAC 6 CLICK MOBILITY  Turning over in bed (including adjusting bedclothes, sheets and blankets)?: 2  Sitting down on and standing up from a chair with arms (e.g., wheelchair, bedside commode, etc.): 2  Moving from lying on back to sitting on the side of the bed?: 2  Moving to and from a bed to a chair (including a wheelchair)?: 1  Need to walk in hospital room?: 1  Climbing 3-5 steps with a railing?: 1  Basic Mobility Total Score: 9       Therapeutic Activities and Exercises:  Pt appears to have more pain with mobility this date although reporting she is doing "better."  Pt only minimally moving RLE to assist with supine>sit and required max A x 2 to assume sititng EOB.  Pt then leaning backward and to the right to avoid weight through L hip. Max cues to correct and required increased time.  Instructed in APs and LLE LAQs- AAROM for LAQs.  Pt completed 1 stand and was unable to step feet laterally or to pivot to sit in bed side chair this session.  Pt reporting " she feels weak and was returned to sitting then max A x 2 to return to supine. Pt unable to transfer to bed side chair or to ambulate forward.  Upon return to supine, completed 7 reps of heel slides, hip abduction slides, and QS with AAROM for heel slides and hip abduction slides.  Provided pt with ice pack, educated on on:off time and educated extensively on importance of mobility and completing LE/UE exercises during the days.    Patient left HOB elevated with all lines intact, call button in reach, bed alarm on and RN notified..    GOALS:   Multidisciplinary Problems     Physical Therapy Goals        Problem: Physical Therapy Goal    Goal Priority Disciplines Outcome Goal Variances Interventions   Physical Therapy Goal     PT, PT/OT Ongoing (interventions implemented as appropriate)     Description:  Goals to be met by: 18     Patient will increase functional independence with mobility by performin. Supine <> sit with MInimal Assistance  2. Sit to stand transfer with Contact Guard Assistance  3. Bed to chair transfer with Contact Guard Assistance using Rolling Walker  4. Gait  x 50 feet with Contact Guard Assistance using Rolling Walker.   5. Lower extremity exercise program x 10 reps per handout, with assistance as needed                      Time Tracking:     PT Received On: 18  PT Start Time: 952     PT Stop Time:   PT Total Time (min): 30 min with OT    Billable Minutes: Therapeutic Exercise 15    Treatment Type: Treatment  PT/PTA: PT     PTA Visit Number: 0     Kylah Willson, PT  2018

## 2018-11-28 NOTE — PROGRESS NOTES
Patient complaining of pain, tylenol PRN given. Patient asking for different pain medicine bc tylenol not managing pain, called Family medicine to ask and PRN orders will remain the same.

## 2018-11-28 NOTE — PT/OT/SLP PROGRESS
"Occupational Therapy   Treatment    Name: Luda Carranza  MRN: [id number]  Admitting Diagnosis:  Closed displaced intertrochanteric fracture of left femur  2 Days Post-Op    Recommendations:     Discharge Recommendations: nursing facility, skilled  Discharge Equipment Recommendations:  bedside commode, bath bench, walker, rolling  Barriers to discharge:  Decreased caregiver support    Subjective     Pain/Comfort:  · Pain Rating 1: 5/10  · Location - Side 1: Left  · Location - Orientation 1: generalized  · Location 1: hip  · Pain Addressed 1: Reposition, Pre-medicate for activity, Cessation of Activity, Nurse notified, Distraction  · Pain Rating Post-Intervention 1: 5/10    Objective:     Communicated with: shelly prior to session.   General Precautions: Standard, fall   Orthopedic Precautions:LLE weight bearing as tolerated   Braces: N/A     Occupational Performance:    Bed Mobility:    · Patient completed Scooting/Bridging with maximal assistance  · Patient completed Supine to Sit with maximal assistance and 2 persons  · Patient completed Sit to Supine with maximal assistance and 2 persons     Functional Mobility/Transfers:  · Patient completed Sit <> Stand Transfer with maximal assistance and of 2 persons  with  rolling walker   · Functional Mobility: unable to perform     Activities of Daily Living:  · Feeding:  modified independence while supine   · Lower Body Dressing: total assistance          [facility] 6 Click ADL: 16    Treatment & Education:  Language line utilized during session   Pt with decreased tolerance this AM; reports " weakness" and did not eat breakfast yet this AM. Pt agreeable to participate in order to sit in chair and finish breakfast  Max A of 2 for bed mobility and standing this date.   Unable to take functional steps alongside EOB   Able to assist in supine scooting to HOB with BUEs and RLE, however, requires min/mod A    Patient left supine with all lines intact, call button in reach, bed alarm on " "and [facility] notified  Education:    Assessment:     Luda Carranza is a 78 y.o. female with a medical diagnosis of Closed displaced intertrochanteric fracture of left femur.  She presents with the following performance deficits affecting function are weakness, impaired self care skills, impaired balance, impaired functional mobilty, impaired endurance, gait instability, decreased lower extremity function, decreased safety awareness, decreased coordination, decreased ROM, impaired skin, edema, pain, orthopedic precautions. Pt appearing with increased pain this date; requiring increased assist this date, however, reporting "weakness" as the issue and not eating much breakfast. Will cont to progress pt as able. Demonstrates good potential to return to OF     Rehab Prognosis:  Good; patient would benefit from acute skilled OT services to address these deficits and reach maximum level of function.       Plan:     Patient to be seen 5 x/week to address the above listed problems via self-care/home management, therapeutic activities, therapeutic exercises  · Plan of Care Expires: 12/27/18  · Plan of Care Reviewed with: patient    This Plan of care has been discussed with the patient who was involved in its development and understands and is in agreement with the identified goals and treatment plan    GOALS:   Multidisciplinary Problems     Occupational Therapy Goals        Problem: Occupational Therapy Goal    Goal Priority Disciplines Outcome Interventions   Occupational Therapy Goal     OT, PT/OT Ongoing (interventions implemented as appropriate)    Description:  Goals to be met by: 12/27/18     Patient will increase functional independence with ADLs by performing:    LE Dressing with Minimal Assistance.  Grooming while standing with Contact Guard Assistance.  Toileting from bedside commode with Minimal Assistance for hygiene and clothing management.   Supine to sit with Contact Guard Assistance.  Step transfer with Contact " Guard Assistance  Toilet transfer to bedside commode with Contact Guard Assistance.  Upper extremity exercise program x10 reps per handout, with independence.                      Time Tracking:     OT Date of Treatment: 11/28/18  OT Start Time: 0952  OT Stop Time: 1022  OT Total Time (min): 30 min    Billable Minutes:Therapeutic Activity 15 co treatment with PT       Jillian Davison, OT  11/28/2018

## 2018-11-28 NOTE — PROGRESS NOTES
[place] updated pt's son in law  - info has been submitted by [facility] for insurance approval.  Awaiting auth per Orquidea with [facility].

## 2018-11-28 NOTE — PLAN OF CARE
Problem: Patient Care Overview  Goal: Plan of Care Review  Outcome: Ongoing (interventions implemented as appropriate)  Patient AAO x 4. VSS. NAD Currently on diabetic diet. 2 L of oxygen.  Voids via bedpan. . Pain controlled with PRN percocet. Left hip incision, dressing in place, changed today. Safety maintained. Will continue to monitor.

## 2018-11-28 NOTE — PLAN OF CARE
Problem: Patient Care Overview  Goal: Plan of Care Review  Outcome: Ongoing (interventions implemented as appropriate)  Pt AAOx4. Pt on diabetic diet. Pt complaints of pain of the left hip with moderate relief from PRN percocet. Left hip dressing remains CDI. Pt remains on 2L NC. Quinteros DC'd on yesterday and pt has voided via bedpan. Blood glucose monitoring continued. NS 20K infusing at 100 ml/hr. IV antibiotics given per MD orders. Bed locked in lowest position, bed alarm set and call bell within reach. Pt verbalized understanding to call for any needs or assistance. Care plan discussed with daughter, son and law and patient. Will continue to monitor.

## 2018-11-28 NOTE — PROGRESS NOTES
made phone contact with [facility] admit coordinator, Orquidea to check status of referral.  Orquidea reported referral has to be reviewed by medical director for a decision.  She will follow up with  later today.    Referral has been declined by [facility] and [facility] for skilled nursing facility placement.

## 2018-11-28 NOTE — PROGRESS NOTES
Level of care eligibility tool (LOCET) completed.      Level one pre-admission screening and resident review form faxed to [facility] for nursing facility admission approval.

## 2018-11-28 NOTE — SUBJECTIVE & OBJECTIVE
Interval History: Pt lying in bed. In process of transferring to chair with PT. Pain controlled, no acute events overnight. No family at bedside at this time.     Review of Systems   Unable to perform ROS: Other (language barrier, pt drowsy)   Constitutional: Negative for fever.   Respiratory: Negative for chest tightness and shortness of breath.    Cardiovascular: Negative for chest pain.   Gastrointestinal: Negative for abdominal pain, nausea and vomiting.   Musculoskeletal:        Left hip tenderness   Neurological: Negative for dizziness, weakness, light-headedness and headaches.   Psychiatric/Behavioral: Negative for confusion.     Objective:     Vital Signs (Most Recent):  Temp: 97.7 °F (36.5 °C) (11/28/18 1131)  Pulse: 89 (11/28/18 1318)  Resp: 17 (11/28/18 1318)  BP: (!) 117/56 (11/28/18 1131)  SpO2: 99 % (11/28/18 1318) Vital Signs (24h Range):  Temp:  [97.5 °F (36.4 °C)-99.3 °F (37.4 °C)] 97.7 °F (36.5 °C)  Pulse:  [78-95] 89  Resp:  [16-20] 17  SpO2:  [91 %-99 %] 99 %  BP: (117-153)/(56-68) 117/56     Weight: 76.5 kg (168 lb 10.4 oz)  Body mass index is 29.88 kg/m².    Intake/Output Summary (Last 24 hours) at 11/28/2018 1410  Last data filed at 11/28/2018 1240  Gross per 24 hour   Intake 1882.5 ml   Output 1400 ml   Net 482.5 ml      Physical Exam   Constitutional: She appears well-developed and well-nourished. No distress.   HENT:   Head: Normocephalic and atraumatic.   Eyes: EOM are normal. Pupils are equal, round, and reactive to light.   Cardiovascular: Normal rate and regular rhythm.   Pulmonary/Chest: Effort normal and breath sounds normal. No respiratory distress.   Abdominal: Soft. Bowel sounds are normal.   Musculoskeletal: She exhibits tenderness (mild tenderness to surgical site).   Pulses, sensation intact to  LLE    Neurological: She is alert. No cranial nerve deficit or sensory deficit. She exhibits normal muscle tone.   Skin: Skin is warm and dry. She is not diaphoretic.   Psychiatric: She  has a normal mood and affect. Her behavior is normal.       Significant Labs:   CBC:   Recent Labs   Lab 11/26/18  1606 11/27/18  0530 11/27/18  1238 11/28/18  0545   WBC 15.16* 11.15  --  10.84   HGB 11.1* 7.7* 7.3* 8.4*   HCT 36.0* 24.9* 23.6* 26.8*    203  --  173       Significant Imaging: I have reviewed all pertinent imaging results/findings within the past 24 hours.

## 2018-11-28 NOTE — PLAN OF CARE
Problem: Physical Therapy Goal  Goal: Physical Therapy Goal  Goals to be met by: 18     Patient will increase functional independence with mobility by performin. Supine <> sit with MInimal Assistance  2. Sit to stand transfer with Contact Guard Assistance  3. Bed to chair transfer with Contact Guard Assistance using Rolling Walker  4. Gait  x 50 feet with Contact Guard Assistance using Rolling Walker.   5. Lower extremity exercise program x 10 reps per handout, with assistance as needed     Outcome: Ongoing (interventions implemented as appropriate)  Pt with increased difficulty completing mobility this date, requiring increased assist for mobility and did not tolerate forward steps. Recommending SNF placement upon d/c.

## 2018-11-28 NOTE — PLAN OF CARE
11/28/18 1322   Post-Acute Status   Post-Acute Authorization Placement   Post-Acute Placement Status Pending Payor Review      received phone call from [facility] liaison[facility] stating patient is accepted for admission pending insurance approval.  Orqu[employer] reported she will submit to insurance for authorization today.

## 2018-11-28 NOTE — ASSESSMENT & PLAN NOTE
S/p ORIF left femur (POD #2)     Pt with fall from standing d/t being off balance. XR showing L comminuted intertrochanteric fracture. Pt active and independent prior to fall.     -Ortho  Following   -Pain control and bowel regimen  -continue PT/OT  -admission to rehab pending insurance approval

## 2018-11-28 NOTE — PT/OT/SLP PROGRESS
Physical Therapy Treatment    Patient Name:  Luda Carranza   MRN:  [id number]    Recommendations:     Discharge Recommendations:  nursing facility, skilled   Discharge Equipment Recommendations: (defer to SNF)   Barriers to discharge: decreased mobility,strength and endurance    Assessment:     Luda Carranza is a 78 y.o. female admitted with a medical diagnosis of Closed displaced intertrochanteric fracture of left femur.  She presents with the following impairments/functional limitations:  weakness, impaired functional mobilty, impaired balance, decreased lower extremity function, pain, decreased ROM, impaired coordination, impaired skin, orthopedic precautions ,pt with good participation and requires increased assistance with all mobility and unable to ambulate at this time,pt will benefit from SNF upon discharge.    Rehab Prognosis:  Good; patient would benefit from acute skilled PT services to address these deficits and reach maximum level of function.      Recent Surgery: Procedure(s) (LRB):  ORIF, FRACTURE, FEMUR, INTERTROCHANTERIC (Left) 2 Days Post-Op    Plan:     During this hospitalization, patient to be seen BID to address the above listed problems via gait training, therapeutic activities, therapeutic exercises  · Plan of Care Expires:  12/27/18   Plan of Care Reviewed with: patient, daughter    Subjective     Communicated with nsg prior to session.  Patient found supine upon PT entry to room, agreeable to treatment.      Chief Complaint: n/a  Patient comments/goals: pt agreeable to standing  Pain/Comfort:  · Pain Rating 1: (no rating)  · Location - Side 1: Left  · Location - Orientation 1: generalized  · Location 1: hip  · Pain Addressed 1: Reposition, Distraction    Patients cultural, spiritual, Christian conflicts given the current situation: none reported    Objective:     Patient found with: bed alarm, peripheral IV     General Precautions: Standard, fall   Orthopedic Precautions:LLE weight bearing as  tolerated   Braces: N/A     Functional Mobility:  · Bed Mobility:     · Supine to Sit: moderate assistance and maximal assistance  · Sit to Supine: maximal assistance and of 2 persons  · Transfers:     · Sit to Stand:  moderate assistance and maximal assistance with rolling walker  · Balance: fair standing balance with RW      AM-PAC 6 CLICK MOBILITY  Turning over in bed (including adjusting bedclothes, sheets and blankets)?: 2  Sitting down on and standing up from a chair with arms (e.g., wheelchair, bedside commode, etc.): 2  Moving from lying on back to sitting on the side of the bed?: 2  Moving to and from a bed to a chair (including a wheelchair)?: 1  Need to walk in hospital room?: 1  Climbing 3-5 steps with a railing?: 1  Basic Mobility Total Score: 9       Therapeutic Activities and Exercises: le supine ex's X 10-12 reps inc: ap,qs,hs,abd/add,slr with assistance on L, pre-gait stepping inside RW with L le only,1-2 sidesteps to HOB with RW and Mod A       Patient left supine with all lines intact, call button in reach, bed alarm on and family present..    GOALS:  See general POC  Multidisciplinary Problems     Physical Therapy Goals        Problem: Physical Therapy Goal    Goal Priority Disciplines Outcome Goal Variances Interventions   Physical Therapy Goal     PT, PT/OT Ongoing (interventions implemented as appropriate)     Description:  Goals to be met by: 18     Patient will increase functional independence with mobility by performin. Supine <> sit with MInimal Assistance  2. Sit to stand transfer with Contact Guard Assistance  3. Bed to chair transfer with Contact Guard Assistance using Rolling Walker  4. Gait  x 50 feet with Contact Guard Assistance using Rolling Walker.   5. Lower extremity exercise program x 10 reps per handout, with assistance as needed                      Time Tracking:     PT Received On: 18  PT Start Time: 1357     PT Stop Time: 1430  PT Total Time (min): 33  min     Billable Minutes: Therapeutic Activity 19 and Therapeutic Exercise 14    Treatment Type: Treatment  PT/PTA: PTA     PTA Visit Number: 0     Bob Lowry, PTA  11/28/2018

## 2018-11-28 NOTE — PROGRESS NOTES
"[facility]  Orthopedics  Progress Note    Patient Name: Luda Carranza  MRN: [id number]  Admission Date: 11/26/2018  Hospital Length of Stay: 2 days  Attending Provider: Uriah Perkins MD  Primary Care Provider: Primary Doctor No  Follow-up For: Procedure(s) (LRB):  ORIF, FRACTURE, FEMUR, INTERTROCHANTERIC (Left)    Post-Operative Day: 2 Days Post-Op  Subjective:     Principal Problem:Closed displaced intertrochanteric fracture of left femur    Principal Orthopedic Problem: same    Interval History:  Patient resting comfortably in bed. Reports pain is "less" today. H/H improved 7.3/23.6 > 8.4/26.8 status post transfusion 1 unit PRBC.     Review of patient's allergies indicates:  No Known Allergies    Current Facility-Administered Medications   Medication    0.9 % NaCl with KCl 20 mEq infusion    0.9%  NaCl infusion (for blood administration)    acetaminophen tablet 650 mg    albuterol-ipratropium 2.5 mg-0.5 mg/3 mL nebulizer solution 3 mL    aspirin EC tablet 81 mg    atorvastatin tablet 10 mg    dextrose 50% injection 12.5 g    dextrose 50% injection 25 g    enoxaparin injection 40 mg    famotidine tablet 20 mg    ferrous gluconate tablet 324 mg    glucagon (human recombinant) injection 1 mg    glucose chewable tablet 16 g    glucose chewable tablet 24 g    HYDROcodone-acetaminophen 5-325 mg per tablet 1 tablet    influenza (FLUZONE HIGH-DOSE) vaccine 0.5 mL    insulin aspart U-100 pen 0-5 Units    losartan tablet 50 mg    morphine injection 4 mg    ondansetron disintegrating tablet 8 mg    ondansetron injection 4 mg    oxyCODONE-acetaminophen 7.5-325 mg per tablet 1 tablet    senna-docusate 8.6-50 mg per tablet 1 tablet    sodium chloride 0.9% flush 5 mL     Objective:     Vital Signs (Most Recent):  Temp: 97.7 °F (36.5 °C) (11/28/18 1131)  Pulse: 89 (11/28/18 1318)  Resp: 17 (11/28/18 1318)  BP: (!) 117/56 (11/28/18 1131)  SpO2: 99 % (11/28/18 1318) Vital Signs (24h " "Range):  Temp:  [97.5 °F (36.4 °C)-99.3 °F (37.4 °C)] 97.7 °F (36.5 °C)  Pulse:  [78-95] 89  Resp:  [16-20] 17  SpO2:  [91 %-99 %] 99 %  BP: (117-153)/(56-68) 117/56     Weight: 76.5 kg (168 lb 10.4 oz)  Height: 5' 3" (160 cm)  Body mass index is 29.88 kg/m².      Intake/Output Summary (Last 24 hours) at 11/28/2018 1338  Last data filed at 11/28/2018 0815  Gross per 24 hour   Intake 1882.5 ml   Output 700 ml   Net 1182.5 ml       General    Vitals reviewed.  Constitutional: She is oriented to person, place, and time. She appears well-developed and well-nourished.   HENT:   Head: Normocephalic and atraumatic.   Eyes: Pupils are equal, round, and reactive to light.   Cardiovascular: Normal rate.    Pulmonary/Chest: Effort normal.   Neurological: She is alert and oriented to person, place, and time.   Psychiatric: She has a normal mood and affect.            Left lower extremity NVI  Dressing is clean dry and intact to left hip. This was removed to reveal incision(s) with staples in place no sign of infection.    Significant Labs: All pertinent labs within the past 24 hours have been reviewed.    Significant Imaging: I have reviewed all pertinent imaging results/findings.    Assessment/Plan:     Active Diagnoses:    Diagnosis Date Noted POA    PRINCIPAL PROBLEM:  Closed displaced intertrochanteric fracture of left femur [S72.142A] 11/26/2018 Yes    Postoperative anemia due to acute blood loss [D62] 11/27/2018 No    Essential hypertension [I10] 11/26/2018 Yes     Chronic    Type 2 diabetes mellitus without complication, without long-term current use of insulin [E11.9] 11/26/2018 Yes     Chronic    GERD (gastroesophageal reflux disease) [K21.9] 11/26/2018 Yes    HLD (hyperlipidemia) [E78.5] 11/26/2018 Yes     Chronic      Problems Resolved During this Admission:    Diagnosis Date Noted Date Resolved POA    Leukocytosis [D72.829] 11/26/2018 11/27/2018 Yes         H/H improved.  Continue pain control with bowel " regimen.  Continue PT/OT.  Dressing was removed and replaced with surgical Aquacel.     Belkis Heller PA-C  Orthopedics  [facility]

## 2018-11-29 VITALS
TEMPERATURE: 99 F | SYSTOLIC BLOOD PRESSURE: 161 MMHG | DIASTOLIC BLOOD PRESSURE: 69 MMHG | HEIGHT: 63 IN | HEART RATE: 99 BPM | RESPIRATION RATE: 19 BRPM | BODY MASS INDEX: 29.88 KG/M2 | OXYGEN SATURATION: 95 % | WEIGHT: 168.63 LBS

## 2018-11-29 PROBLEM — Z98.890 S/P ORIF (OPEN REDUCTION INTERNAL FIXATION) FRACTURE: Status: ACTIVE | Noted: 2018-11-29

## 2018-11-29 PROBLEM — Z87.81 S/P ORIF (OPEN REDUCTION INTERNAL FIXATION) FRACTURE: Status: ACTIVE | Noted: 2018-11-29

## 2018-11-29 LAB
ANION GAP SERPL CALC-SCNC: 3 MMOL/L
BASOPHILS # BLD AUTO: 0.02 K/UL
BASOPHILS NFR BLD: 0.2 %
BILIRUB UR QL STRIP: NEGATIVE
BUN SERPL-MCNC: 10 MG/DL
CALCIUM SERPL-MCNC: 8.6 MG/DL
CHLORIDE SERPL-SCNC: 101 MMOL/L
CLARITY UR: CLEAR
CO2 SERPL-SCNC: 31 MMOL/L
COLOR UR: YELLOW
CREAT SERPL-MCNC: 0.5 MG/DL
DIFFERENTIAL METHOD: ABNORMAL
EOSINOPHIL # BLD AUTO: 0.2 K/UL
EOSINOPHIL NFR BLD: 2 %
ERYTHROCYTE [DISTWIDTH] IN BLOOD BY AUTOMATED COUNT: 13 %
EST. GFR  (AFRICAN AMERICAN): >60 ML/MIN/1.73 M^2
EST. GFR  (NON AFRICAN AMERICAN): >60 ML/MIN/1.73 M^2
GLUCOSE SERPL-MCNC: 163 MG/DL
GLUCOSE UR QL STRIP: NEGATIVE
HCT VFR BLD AUTO: 26.5 %
HGB BLD-MCNC: 8.3 G/DL
HGB UR QL STRIP: NEGATIVE
KETONES UR QL STRIP: NEGATIVE
LEUKOCYTE ESTERASE UR QL STRIP: ABNORMAL
LYMPHOCYTES # BLD AUTO: 2.6 K/UL
LYMPHOCYTES NFR BLD: 22.9 %
MCH RBC QN AUTO: 28.1 PG
MCHC RBC AUTO-ENTMCNC: 31.3 G/DL
MCV RBC AUTO: 90 FL
MICROSCOPIC COMMENT: NORMAL
MONOCYTES # BLD AUTO: 1 K/UL
MONOCYTES NFR BLD: 9.3 %
NEUTROPHILS # BLD AUTO: 7.3 K/UL
NEUTROPHILS NFR BLD: 65.2 %
NITRITE UR QL STRIP: NEGATIVE
PH UR STRIP: 7 [PH] (ref 5–8)
PLATELET # BLD AUTO: 185 K/UL
PMV BLD AUTO: 9.3 FL
POCT GLUCOSE: 173 MG/DL (ref 70–110)
POCT GLUCOSE: 183 MG/DL (ref 70–110)
POCT GLUCOSE: 199 MG/DL (ref 70–110)
POCT GLUCOSE: 256 MG/DL (ref 70–110)
POTASSIUM SERPL-SCNC: 4.2 MMOL/L
PROT UR QL STRIP: NEGATIVE
RBC # BLD AUTO: 2.95 M/UL
RBC #/AREA URNS HPF: 1 /HPF (ref 0–4)
SODIUM SERPL-SCNC: 135 MMOL/L
SP GR UR STRIP: 1.01 (ref 1–1.03)
URN SPEC COLLECT METH UR: ABNORMAL
UROBILINOGEN UR STRIP-ACNC: NEGATIVE EU/DL
WBC # BLD AUTO: 11.19 K/UL
WBC #/AREA URNS HPF: 3 /HPF (ref 0–5)

## 2018-11-29 PROCEDURE — 25000003 PHARM REV CODE 250: Performed by: PHYSICIAN ASSISTANT

## 2018-11-29 PROCEDURE — 97530 THERAPEUTIC ACTIVITIES: CPT

## 2018-11-29 PROCEDURE — 85025 COMPLETE CBC W/AUTO DIFF WBC: CPT

## 2018-11-29 PROCEDURE — 27000221 HC OXYGEN, UP TO 24 HOURS

## 2018-11-29 PROCEDURE — 63600175 PHARM REV CODE 636 W HCPCS: Performed by: HOSPITALIST

## 2018-11-29 PROCEDURE — 25000003 PHARM REV CODE 250: Performed by: HOSPITALIST

## 2018-11-29 PROCEDURE — 94799 UNLISTED PULMONARY SVC/PX: CPT

## 2018-11-29 PROCEDURE — 80048 BASIC METABOLIC PNL TOTAL CA: CPT

## 2018-11-29 PROCEDURE — 97110 THERAPEUTIC EXERCISES: CPT

## 2018-11-29 PROCEDURE — 81000 URINALYSIS NONAUTO W/SCOPE: CPT

## 2018-11-29 PROCEDURE — G8980 MOBILITY D/C STATUS: HCPCS | Mod: CL

## 2018-11-29 PROCEDURE — 94761 N-INVAS EAR/PLS OXIMETRY MLT: CPT

## 2018-11-29 PROCEDURE — 36415 COLL VENOUS BLD VENIPUNCTURE: CPT

## 2018-11-29 PROCEDURE — 25000003 PHARM REV CODE 250: Performed by: ORTHOPAEDIC SURGERY

## 2018-11-29 RX ORDER — ENOXAPARIN SODIUM 100 MG/ML
40 INJECTION SUBCUTANEOUS DAILY
Qty: 12 ML | Refills: 0
Start: 2018-11-29 | End: 2018-12-29

## 2018-11-29 RX ORDER — OXYCODONE AND ACETAMINOPHEN 7.5; 325 MG/1; MG/1
1 TABLET ORAL EVERY 4 HOURS PRN
Status: DISCONTINUED | OUTPATIENT
Start: 2018-11-29 | End: 2018-11-29 | Stop reason: HOSPADM

## 2018-11-29 RX ORDER — BISACODYL 5 MG
5 TABLET, DELAYED RELEASE (ENTERIC COATED) ORAL DAILY PRN
Refills: 0
Start: 2018-11-29 | End: 2018-12-09

## 2018-11-29 RX ORDER — AMOXICILLIN 250 MG
1 CAPSULE ORAL 2 TIMES DAILY
Start: 2018-11-29 | End: 2019-01-08

## 2018-11-29 RX ORDER — FERROUS GLUCONATE 324(38)MG
324 TABLET ORAL
Start: 2018-11-30

## 2018-11-29 RX ORDER — HYDROCHLOROTHIAZIDE 12.5 MG/1
12.5 TABLET ORAL DAILY
Status: DISCONTINUED | OUTPATIENT
Start: 2018-11-29 | End: 2018-11-29 | Stop reason: HOSPADM

## 2018-11-29 RX ORDER — BISACODYL 5 MG
5 TABLET, DELAYED RELEASE (ENTERIC COATED) ORAL DAILY PRN
Status: DISCONTINUED | OUTPATIENT
Start: 2018-11-29 | End: 2018-11-29 | Stop reason: HOSPADM

## 2018-11-29 RX ORDER — OXYCODONE AND ACETAMINOPHEN 7.5; 325 MG/1; MG/1
1 TABLET ORAL EVERY 4 HOURS PRN
Qty: 40 TABLET | Refills: 0 | Status: SHIPPED | OUTPATIENT
Start: 2018-11-29

## 2018-11-29 RX ORDER — OXYCODONE AND ACETAMINOPHEN 7.5; 325 MG/1; MG/1
1 TABLET ORAL EVERY 4 HOURS PRN
Qty: 30 TABLET | Refills: 0 | Status: SHIPPED | OUTPATIENT
Start: 2018-11-29 | End: 2019-01-08 | Stop reason: SDUPTHER

## 2018-11-29 RX ADMIN — HYDROCODONE BITARTRATE AND ACETAMINOPHEN 1 TABLET: 5; 325 TABLET ORAL at 04:11

## 2018-11-29 RX ADMIN — FERROUS GLUCONATE 324 MG: 324 TABLET ORAL at 09:11

## 2018-11-29 RX ADMIN — STANDARDIZED SENNA CONCENTRATE AND DOCUSATE SODIUM 1 TABLET: 8.6; 5 TABLET, FILM COATED ORAL at 09:11

## 2018-11-29 RX ADMIN — LOSARTAN POTASSIUM 50 MG: 50 TABLET, FILM COATED ORAL at 09:11

## 2018-11-29 RX ADMIN — ENOXAPARIN SODIUM 40 MG: 100 INJECTION SUBCUTANEOUS at 05:11

## 2018-11-29 RX ADMIN — FAMOTIDINE 20 MG: 20 TABLET ORAL at 09:11

## 2018-11-29 RX ADMIN — ASPIRIN 81 MG: 81 TABLET, COATED ORAL at 09:11

## 2018-11-29 RX ADMIN — ATORVASTATIN CALCIUM 10 MG: 10 TABLET, FILM COATED ORAL at 09:11

## 2018-11-29 RX ADMIN — OXYCODONE HYDROCHLORIDE AND ACETAMINOPHEN 1 TABLET: 7.5; 325 TABLET ORAL at 09:11

## 2018-11-29 RX ADMIN — HYDROCHLOROTHIAZIDE 12.5 MG: 12.5 TABLET ORAL at 09:11

## 2018-11-29 RX ADMIN — INSULIN ASPART 3 UNITS: 100 INJECTION, SOLUTION INTRAVENOUS; SUBCUTANEOUS at 12:11

## 2018-11-29 RX ADMIN — OXYCODONE HYDROCHLORIDE AND ACETAMINOPHEN 1 TABLET: 7.5; 325 TABLET ORAL at 05:11

## 2018-11-29 RX ADMIN — HYDROCODONE BITARTRATE AND ACETAMINOPHEN 1 TABLET: 5; 325 TABLET ORAL at 08:11

## 2018-11-29 NOTE — PLAN OF CARE
11/29/18 1246   Post-Acute Status   Post-Acute Authorization Placement   Post-Acute Placement Status Additional Clinical Requested      received phone call from [facility] clinical liaison, [facility].  She reported that [facility] might be able to accept patient today.  [facility] corporate office is making contact with the insurance.  [employer] request that  send transfer facility orders for [facility] for review.  [employer] will follow up with  later today regarding status of admission.

## 2018-11-29 NOTE — PT/OT/SLP PROGRESS
Occupational Therapy   Treatment    Name: Luda Carranza  MRN: [id number]  Admitting Diagnosis:  Closed displaced intertrochanteric fracture of left femur  3 Days Post-Op    Recommendations:     Discharge Recommendations: nursing facility, skilled  Discharge Equipment Recommendations:  bedside commode, bath bench, walker, rolling  Barriers to discharge:  Decreased caregiver support    Subjective     Pain/Comfort:  · Pain Rating 1: 6/10  · Location - Side 1: Left  · Location - Orientation 1: generalized  · Location 1: hip  · Pain Addressed 1: Reposition, Distraction  · Pain Rating Post-Intervention 1: 5/10    Objective:     Communicated with: nsg prior to session.    General Precautions: Standard, fall   Orthopedic Precautions:LLE weight bearing as tolerated   Braces: N/A     Occupational Performance:    Bed Mobility:    · Patient completed Scooting/Bridging with minimum assistance  · Patient completed Supine to Sit with maximal assistance     Functional Mobility/Transfers:  · Patient completed Sit <> Stand Transfer with maximal assistance  with  rolling walker   · Patient completed Bed <> Chair Transfer using Step Transfer technique with moderate assistance and of 2 persons with rolling walker  · Functional Mobility: Mod A of 2 steps to b/s chair       Activities of Daily Living:  · Lower Body Dressing: total assistance        [facility] 6 Click ADL: 16    Treatment & Education:  Language line utilized during session; pt speaks Estonian  Requires increased time for axs and multiple cues/redirction to safety with RW, standing, and ambulation   Max A stands from EOB with RW- pt moves hands to varying areas of RW; impaired safety   Mod A of 2 steps to b/s chair with RW       Patient left UIC with all lines intact, call button in reach, chair alarm on and nsg notified  Education:    Assessment:     Luda Carranza is a 78 y.o. female with a medical diagnosis of Closed displaced intertrochanteric fracture of left femur.  She presents  with the following performance deficits affecting function are weakness, impaired self care skills, impaired balance, impaired functional mobilty, impaired endurance, gait instability, pain, decreased ROM, decreased safety awareness, decreased lower extremity function, orthopedic precautions, edema, impaired skin. Pt continues to require increased assist at this time 2/2 pain; able to take steps today and t/f in b/s chair. Cont OT POC    Rehab Prognosis:  Good; patient would benefit from acute skilled OT services to address these deficits and reach maximum level of function.       Plan:     Patient to be seen 5 x/week to address the above listed problems via self-care/home management, therapeutic activities, therapeutic exercises  · Plan of Care Expires: 12/27/18  · Plan of Care Reviewed with: patient    This Plan of care has been discussed with the patient who was involved in its development and understands and is in agreement with the identified goals and treatment plan    GOALS:   Multidisciplinary Problems     Occupational Therapy Goals        Problem: Occupational Therapy Goal    Goal Priority Disciplines Outcome Interventions   Occupational Therapy Goal     OT, PT/OT Ongoing (interventions implemented as appropriate)    Description:  Goals to be met by: 12/27/18     Patient will increase functional independence with ADLs by performing:    LE Dressing with Minimal Assistance.  Grooming while standing with Contact Guard Assistance.  Toileting from bedside commode with Minimal Assistance for hygiene and clothing management.   Supine to sit with Contact Guard Assistance.  Step transfer with Contact Guard Assistance  Toilet transfer to bedside commode with Contact Guard Assistance.  Upper extremity exercise program x10 reps per handout, with independence.                      Time Tracking:     OT Date of Treatment: 11/29/18  OT Start Time: 1049  OT Stop Time: 1118  OT Total Time (min): 29 min    Billable  Minutes:Therapeutic Activity 10 co treatment with pt     Jillian Davison, OT  11/29/2018

## 2018-11-29 NOTE — PLAN OF CARE
[facility]    FACILITY TRANSFER ORDERS      Patient Name: Luda Carranza  YOB: 1940    PCP: Primary Doctor No   PCP Address: None  PCP Phone Number: None  PCP Fax: None    Encounter Date: 11/29/2018    [facility]     Vital Signs:  Routine    Diagnoses:   Active Hospital Problems    Diagnosis  POA    *Closed displaced intertrochanteric fracture of left femur [S72.142A]  Yes    S/P ORIF (open reduction internal fixation) fracture [Z96.7, Z87.81]  Not Applicable    Postoperative anemia due to acute blood loss [D62]  No    Essential hypertension [I10]  Yes     Chronic    Type 2 diabetes mellitus without complication, without long-term current use of insulin [E11.9]  Yes     Chronic    GERD (gastroesophageal reflux disease) [K21.9]  Yes    HLD (hyperlipidemia) [E78.5]  Yes     Chronic      Resolved Hospital Problems    Diagnosis Date Resolved POA    Leukocytosis [D72.829] 11/27/2018 Yes       Allergies:Review of patient's allergies indicates:  No Known Allergies    Diet: cardiac diet and diabetic diet: 2000 calorie    Activities: Activity as tolerated and Up with assistance    Nursing: per facility protocol     Labs: CBC Once 12/3/18    CONSULTS:    Physical Therapy to evaluate and treat.  and Occupational Therapy to evaluate and treat.    MISCELLANEOUS CARE:  Diabetes Care:   SN to perform and educate Diabetic management with blood glucose monitoring:, Fingerstick blood sugar AC and HS and Report CBG < 60 or > 350 to physician.     Fall precautions  Encourage incentive spirometry      WOUND CARE ORDERS  Yes: Surgical Wound:  Location: L Hip. Keep clean and dry, monitor for signs /symtpoms of infection including erythema, edema, drainage and pain. Dress with surgical  Aquacel.    Medications: Review discharge medications with patient and family and provide education.      Current Discharge Medication List      START taking these medications    Details   bisacodyl  (DULCOLAX) 5 mg EC tablet Take 1 tablet (5 mg total) by mouth daily as needed for Constipation.  Refills: 0      enoxaparin (LOVENOX) 40 mg/0.4 mL Syrg Inject 0.4 mLs (40 mg total) into the skin once daily.  Qty: 12 mL, Refills: 0      ferrous gluconate (FERGON) 324 MG tablet Take 1 tablet (324 mg total) by mouth daily with breakfast.      oxyCODONE-acetaminophen (PERCOCET) 7.5-325 mg per tablet Take 1 tablet by mouth every 4 (four) hours as needed.  Qty: 30 tablet, Refills: 0      senna-docusate 8.6-50 mg (PERICOLACE) 8.6-50 mg per tablet Take 1 tablet by mouth 2 (two) times daily.         CONTINUE these medications which have NOT CHANGED    Details   aspirin (ECOTRIN) 81 MG EC tablet Take 81 mg by mouth once daily.      atorvastatin (LIPITOR) 10 MG tablet Take 10 mg by mouth once daily.      losartan-hydrochlorothiazide 50-12.5 mg (HYZAAR) 50-12.5 mg per tablet Take 1 tablet by mouth once daily.      metFORMIN (GLUCOPHAGE) 500 MG tablet Take 500 mg by mouth 2 (two) times daily with meals.      ranitidine (ZANTAC) 300 MG tablet Take 300 mg by mouth every evening.                  _________________________________  Deya Alvarez NP  11/29/2018

## 2018-11-29 NOTE — PT/OT/SLP PROGRESS
Physical Therapy Treatment    Patient Name:  Luda Carranza   MRN:  [id number]    Recommendations:     Discharge Recommendations:  nursing facility, skilled   Discharge Equipment Recommendations: (defer to SNF)   Barriers to discharge: decreased mobility and strength    Assessment:     Luda Carranza is a 78 y.o. female admitted with a medical diagnosis of Closed displaced intertrochanteric fracture of left femur.  She presents with the following impairments/functional limitations:  weakness, impaired endurance, impaired functional mobilty, gait instability, impaired balance, decreased lower extremity function, pain, decreased ROM, impaired coordination, impaired skin, orthopedic precautions ,pt with increased sitting tolerance today and required Max A for t'f's this PM,pt will benefit from SNF upon discharge.    Rehab Prognosis:  Good; patient would benefit from acute skilled PT services to address these deficits and reach maximum level of function.      Recent Surgery: Procedure(s) (LRB):  ORIF, FRACTURE, FEMUR, INTERTROCHANTERIC (Left) 3 Days Post-Op    Plan:     During this hospitalization, patient to be seen BID to address the above listed problems via gait training, therapeutic activities, therapeutic exercises  · Plan of Care Expires:  12/27/18   Plan of Care Reviewed with: patient    Subjective     Communicated with nsg prior to session.  Patient found up in chair upon PT entry to room, agreeable to treatment.      Chief Complaint: n/a  Patient comments/goals: pt pleased with increased sitting tolerance.  Pain/Comfort:  · Pain Rating 1: (no rating)  · Location - Side 1: Left  · Location - Orientation 1: generalized  · Location 1: hip  · Pain Addressed 1: Reposition, Distraction    Patients cultural, spiritual, Mosque conflicts given the current situation: none reported    Objective:     Patient found with: (chair alarm)     General Precautions: Standard, fall   Orthopedic Precautions:LLE weight bearing as  tolerated   Braces: N/A     Functional Mobility:  · Bed Mobility:     · Sit to Supine: maximal assistance and of 2 persons  · Transfers:     · Sit to Stand:  maximal assistance and X 3 trials with rolling walker  · Bed to Chair: moderate assistance and maximal assistance with  rolling walker  using  Step Transfer  · Balance: fair standing balance with RW      AM-PAC 6 CLICK MOBILITY  Turning over in bed (including adjusting bedclothes, sheets and blankets)?: 2  Sitting down on and standing up from a chair with arms (e.g., wheelchair, bedside commode, etc.): 2  Moving from lying on back to sitting on the side of the bed?: 2  Moving to and from a bed to a chair (including a wheelchair)?: 2  Need to walk in hospital room?: 1  Climbing 3-5 steps with a railing?: 1  Basic Mobility Total Score: 10       Therapeutic Activities and Exercises: assisted nsg with setting pt on bed pan in chair and with removal,le supine ex's X 10-12 reps inc: ap,qs,hs,abd/add,slr with assistance on L       Patient left supine with all lines intact, call button in reach, bed alarm on, nsg notified and daughter present..    GOALS: see general POC  Multidisciplinary Problems     Physical Therapy Goals        Problem: Physical Therapy Goal    Goal Priority Disciplines Outcome Goal Variances Interventions   Physical Therapy Goal     PT, PT/OT Ongoing (interventions implemented as appropriate)     Description:  Goals to be met by: 18     Patient will increase functional independence with mobility by performin. Supine <> sit with MInimal Assistance  2. Sit to stand transfer with Contact Guard Assistance  3. Bed to chair transfer with Contact Guard Assistance using Rolling Walker  4. Gait  x 50 feet with Contact Guard Assistance using Rolling Walker.   5. Lower extremity exercise program x 10 reps per handout, with assistance as needed                      Time Tracking:     PT Received On: 18  PT Start Time: 1258(and 1333)     PT  Stop Time: 1310(to 1357)  PT Total Time (min): 12 min     Billable Minutes: Therapeutic Activity 26 and Therapeutic Exercise 10    Treatment Type: Treatment  PT/PTA: PTA     PTA Visit Number: 0     Bob Lowry, OSVALDO  11/29/2018

## 2018-11-29 NOTE — PLAN OF CARE
Problem: Physical Therapy Goal  Goal: Physical Therapy Goal  Goals to be met by: 18     Patient will increase functional independence with mobility by performin. Supine <> sit with MInimal Assistance  2. Sit to stand transfer with Contact Guard Assistance  3. Bed to chair transfer with Contact Guard Assistance using Rolling Walker  4. Gait  x 50 feet with Contact Guard Assistance using Rolling Walker.   5. Lower extremity exercise program x 10 reps per handout, with assistance as needed     Outcome: Ongoing (interventions implemented as appropriate)  Pt making improvements with functional mobility as she was able to take 5-6 small steps to complete transfer to bedside chair. Continues to require max A for bed mobility, to stand, and take steps to transfer. Recommending SNF placement upon d/c.

## 2018-11-29 NOTE — PLAN OF CARE
Problem: Patient Care Overview  Goal: Plan of Care Review  Patient on oxygen with documented flow.  Will continue to monitor.

## 2018-11-29 NOTE — PROGRESS NOTES
Discharge summary attached in [facility] for [facility] admit coordinator and physician to review for admission today.

## 2018-11-29 NOTE — PLAN OF CARE
11/29/18 1413   Post-Acute Status   Post-Acute Authorization Placement   Post-Acute Placement Status Additional Clinical Requested      received phone call from [facility] admit coordinator, Deya stating her physician is requesting a discharge summary prior to admission today.  Dr. Perkins informed.     received phone call from [facility] stating [facility] not [facility]. They are not able to obtain a single case agreement.

## 2018-11-29 NOTE — ASSESSMENT & PLAN NOTE
POD #3 Hbg 11>7.7>7.3> 8.4     --received 1 unit PRBC inpatient  --continue daily iron supplement

## 2018-11-29 NOTE — PLAN OF CARE
Problem: Patient Care Overview  Goal: Plan of Care Review  Outcome: Ongoing (interventions implemented as appropriate)  Pt speaks english but speak slower for her to understand. Pt hasnt had a BM yet, but has been voiding enough. Pt has skin breakdown on left leg below dressing from recent hip surgery. Pt BP gets elevated when in pain.

## 2018-11-29 NOTE — PLAN OF CARE
Problem: Occupational Therapy Goal  Goal: Occupational Therapy Goal  Goals to be met by: 12/27/18     Patient will increase functional independence with ADLs by performing:    LE Dressing with Minimal Assistance.  Grooming while standing with Contact Guard Assistance.  Toileting from bedside commode with Minimal Assistance for hygiene and clothing management.   Supine to sit with Contact Guard Assistance.  Step transfer with Contact Guard Assistance  Toilet transfer to bedside commode with Contact Guard Assistance.  Upper extremity exercise program x10 reps per handout, with independence.     Outcome: Ongoing (interventions implemented as appropriate)  Pt continues to require increased assist at this time 2/2 pain; able to take steps today and t/f in b/s chair. Cont OT POC

## 2018-11-29 NOTE — PROGRESS NOTES
POD 3  Doing well  Af VSS  P/E- left hip with slight drainage         NVI    HCT stable    P: OK for DC to SNF       Routine wound care       Followup in 7-10 days

## 2018-11-29 NOTE — DISCHARGE SUMMARY
"[facility] Hospital Medicine  Discharge Summary      Patient Name: Luda Carranza  MRN: [id number]  Admission Date: 11/26/2018  Hospital Length of Stay: 3 days  Discharge Date and Time:  11/29/2018 2:49 PM  Attending Physician: Uriah Perkins MD   Discharging Provider: Deya Alvarez NP  Primary Care Provider: Primary Doctor No      HPI:   Ms. Luda Carranza is a 79 yo woman with HTN, HLD, GERD, and NIDDM II who presents with hip fracture. History is limited by a language barrier and her son-in-law acts as . At the time of interview pt is very drowsy from pain medication. She is visiting her family in the [place] from [place]. She is generally very independent and walks unassisted. She has occasional issues with balance. She does yoga daily. JYOTI reports that she had a fall from standing when she was walking in the kitchen and "lost balance", after which she experienced significant pain in the left hip. Her family brought her to the ED, where she was found to have a comminuted intertrochanteric left femur fracture. CXR showed diffuse nonspecific interstitial coarsening, possibly mild pulmonary edema. Vital signs were stable other than an elevated BP (-183). Labs were significant for WBC 15. UA was negative  She was admitted to hospital medicine with an ortho consult, planning for repair the evening of 11/26.     Procedure(s) (LRB):  ORIF, FRACTURE, FEMUR, INTERTROCHANTERIC (Left)      Hospital Course:   Pt s/p left femur ORIF (POD #3), doing well. She reports minimal pain, tolerating PT. Neurovascular intact. Post op Hbg 7.7>7.3>8.4, improved s/p 1 unit PRBC. VSS. Pt with temp 101 overnight, likely reactive no indication of acute infection. No leukocytosis, surgical site clean/dry/intact with no signs of infection. CXR negative. UA pending on discharge. Pt denies urinary symptoms. Pt medically stable for transfer to inpatient rehab.        Consults:   Consults (From admission, onward) "        Status Ordering Provider     Orthopedic Surgery  Once     Provider:  Ryan Mares MD    Acknowledged JOSE RAMIREZ          * Closed displaced intertrochanteric fracture of left femur    S/p ORIF left femur (POD #3)     Pt with fall from standing d/t being off balance. XR showing L comminuted intertrochanteric fracture. Pt active and independent prior to fall.     -transfer to inpatient rehab for continued PT   -continue pain control and bowel regimen         Postoperative anemia due to acute blood loss    POD #3 Hbg 11>7.7>7.3> 8.4     --received 1 unit PRBC inpatient  --continue daily iron supplement          HLD (hyperlipidemia)    -Continue statin     GERD (gastroesophageal reflux disease)    -continue PPI        Type 2 diabetes mellitus without complication, without long-term current use of insulin    A1C 6.0  --continue metformin    -Monitor BG AC and HS and use SSI  -Diabetic diet        Essential hypertension    BP stable   Continue losartan-hctz         Final Active Diagnoses:    Diagnosis Date Noted POA    PRINCIPAL PROBLEM:  Closed displaced intertrochanteric fracture of left femur [S72.142A] 11/26/2018 Yes    S/P ORIF (open reduction internal fixation) fracture [Z96.7, Z87.81] 11/29/2018 Not Applicable    Postoperative anemia due to acute blood loss [D62] 11/27/2018 No    Essential hypertension [I10] 11/26/2018 Yes     Chronic    Type 2 diabetes mellitus without complication, without long-term current use of insulin [E11.9] 11/26/2018 Yes     Chronic    GERD (gastroesophageal reflux disease) [K21.9] 11/26/2018 Yes    HLD (hyperlipidemia) [E78.5] 11/26/2018 Yes     Chronic      Problems Resolved During this Admission:    Diagnosis Date Noted Date Resolved POA    Leukocytosis [D72.829] 11/26/2018 11/27/2018 Yes       Discharged Condition: fair    Disposition: Rehab Facility    Follow Up:  Follow-up Information     Keshav Guthrie Jr, MD On 12/3/2018.    Specialties:  Hand Surgery,  Orthopedic Surgery  Why:  3:40 pm       Contact information:  [address] [place] [address]  [phone]                 Patient Instructions:      Diet diabetic     Diet Cardiac     Activity as tolerated       Significant Diagnostic Studies: Labs:   BMP:   Recent Labs   Lab 11/28/18  0545 11/29/18  0423   * 163*    135*   K 4.1 4.2    101   CO2 31* 31*   BUN 12 10   CREATININE 0.6 0.5   CALCIUM 8.7 8.6*    and CBC   Recent Labs   Lab 11/28/18  0545 11/29/18  0423   WBC 10.84 11.19   HGB 8.4* 8.3*   HCT 26.8* 26.5*    185       Pending Diagnostic Studies:     Procedure Component Value Units Date/Time    Urinalysis [[id number]] Collected:  11/29/18 1356    Order Status:  Sent Lab Status:  In process Updated:  11/29/18 1412    Specimen:  Urine     X-Ray Pelvis Routine AP [[id number]]     Order Status:  Sent Lab Status:  No result          Medications:  Reconciled Home Medications:      Medication List      START taking these medications    bisacodyl 5 mg EC tablet  Commonly known as:  DULCOLAX  Take 1 tablet (5 mg total) by mouth daily as needed for Constipation.     enoxaparin 40 mg/0.4 mL Syrg  Commonly known as:  LOVENOX  Inject 0.4 mLs (40 mg total) into the skin once daily.     ferrous gluconate 324 MG tablet  Commonly known as:  FERGON  Take 1 tablet (324 mg total) by mouth daily with breakfast.  Start taking on:  11/30/2018     oxyCODONE-acetaminophen 7.5-325 mg per tablet  Commonly known as:  PERCOCET  Take 1 tablet by mouth every 4 (four) hours as needed.     senna-docusate 8.6-50 mg 8.6-50 mg per tablet  Commonly known as:  PERICOLACE  Take 1 tablet by mouth 2 (two) times daily.        CONTINUE taking these medications    aspirin 81 MG EC tablet  Commonly known as:  ECOTRIN  Take 81 mg by mouth once daily.     atorvastatin 10 MG tablet  Commonly known as:  LIPITOR  Take 10 mg by mouth once daily.     losartan-hydrochlorothiazide 50-12.5 mg 50-12.5 mg per tablet  Commonly  known as:  HYZAAR  Take 1 tablet by mouth once daily.     metFORMIN 500 MG tablet  Commonly known as:  GLUCOPHAGE  Take 500 mg by mouth 2 (two) times daily with meals.     ranitidine 300 MG tablet  Commonly known as:  ZANTAC  Take 300 mg by mouth every evening.            Indwelling Lines/Drains at time of discharge:   Lines/Drains/Airways          None          Time spent on the discharge of patient: 30 minutes  Patient was seen and examined on the date of discharge and determined to be suitable for discharge.         Deya Alvarez NP  Department of Hospital Medicine  [facility]

## 2018-11-29 NOTE — PLAN OF CARE
11/29/18 1102   Post-Acute Status   Post-Acute Authorization Placement   Post-Acute Placement Status Authorization Obtained     [facility] has accepted patient for admission today pending review of transfer facility orders.    [facility] declined referral due to insurance coverage.

## 2018-11-29 NOTE — PLAN OF CARE
received phone call from [facility] admit coordinator, Deya.  She reported transfer facility orders and discharge summary received.  Deya gave permission for patient to be discharged from [facility] at 7:30pm.  She requested that nurse call report to [facility]'s charge nurse at [phone] prior to discharge.     made phone contact with Dr at [facility] and arranged 7:30pm wheelchair van transport with oxygen.    Bedside nurse informed patient will discharge at 7:30pm to [facility].  Nurse to call report to number on front of ambulance packet prior to discharge.    Transition navigator, Key Ba informed son in law patient was ready for discharge to [facility].  Also, [facility] wheelchair van is scheduled to transport at 7:30pm.       Follow-up With  Details  Why  Contact Info   Keshav Guthrie Jr., MD  On 12/3/2018  3:40 pm   [address]  [facility] [address]  [phone]   [facility] [place] [facility]  On 11/29/2018  [facility]  [address] [address]  [phone]        11/29/18 1619   Final Note   Assessment Type Final Discharge Note   Anticipated Discharge Disposition Rehab   What phone number can be called within the next 1-3 days to see how you are doing after discharge? [phone]   Hospital Follow Up  Appt(s) scheduled? Yes   Discharge plans and expectations educations in teach back method with documentation complete? Yes   Right Care Referral Info   Post Acute Recommendation IRF   Referral Type (Inpatient rehab)   Facility Name ([facility])   Street ([address])   [facility], [facility] ([place], [place])

## 2018-11-29 NOTE — ASSESSMENT & PLAN NOTE
S/p ORIF left femur (POD #3)     Pt with fall from standing d/t being off balance. XR showing L comminuted intertrochanteric fracture. Pt active and independent prior to fall.     -transfer to inpatient rehab for continued PT   -continue pain control and bowel regimen

## 2018-11-29 NOTE — PT/OT/SLP PROGRESS
Physical Therapy Treatment    Patient Name:  Luda Carranza   MRN:  [id number]    Recommendations:     Discharge Recommendations:  nursing facility, skilled   Discharge Equipment Recommendations: (defer to SNF)   Barriers to discharge: increased need for physical assist    Assessment:     Luda Carranza is a 78 y.o. female admitted with a medical diagnosis of Closed displaced intertrochanteric fracture of left femur.  She presents with the following impairments/functional limitations:  weakness, impaired endurance, impaired self care skills, impaired functional mobilty, gait instability, impaired balance, decreased lower extremity function, decreased safety awareness, pain, decreased ROM, edema, impaired skin. Pt making improvements with functional mobility as she was able to take 5-6 small steps to complete transfer to bedside chair. Continues to require max A for bed mobility, to stand, and take steps to transfer. Recommending SNF placement upon d/c.    Rehab Prognosis: Good; patient would benefit from acute skilled PT services to address these deficits and reach maximum level of function.      Recent Surgery: Procedure(s) (LRB):  ORIF, FRACTURE, FEMUR, INTERTROCHANTERIC (Left) 3 Days Post-Op    Plan:     During this hospitalization, patient to be seen BID to address the above listed problems via gait training, therapeutic activities, therapeutic exercises  · Plan of Care Expires:  12/27/18   Plan of Care Reviewed with: patient    Subjective     Communicated with NAVARRO Fonseca prior to session.  Patient found supine with HOB elevated upon PT entry to room, agreeable to treatment.      Chief Complaint: abdomen feeling tight because she has not has a bowel movement  Patient comments/goals: pt reporting she feels better today. Pt appears sweaty during session but denied feeling warm, dizzy, lightheaded  Pt understands some English but language ine utilized as well throughout session.     Pain/Comfort:  · Pain Rating 1:  6/10  · Location - Side 1: Left  · Location - Orientation 1: generalized  · Location 1: hip  · Pain Addressed 1: Pre-medicate for activity, Reposition, Distraction, Nurse notified  · Pain Rating Post-Intervention 1: 5/10    Patients cultural, spiritual, Jewish conflicts given the current situation: none reported    Objective:     Patient found with: bed alarm, peripheral IV     General Precautions: Standard, fall   Orthopedic Precautions:LLE weight bearing as tolerated   Braces: N/A     Functional Mobility:  · Bed Mobility:     · Scooting: minimum assistance  · Supine to Sit: maximal assistance  · Transfers:     · Sit to Stand:  maximal assistance with rolling walker  · Bed to Chair: moderate assistance and of 2 persons with  rolling walker  using  Stand Pivot  · Gait: 3-5 small steps with RW and mod A x 2 while transfering from bed>chair to the left. Max cues for upright posture, increased UE WB, and sequencing as pt with increased trunk flexion over RW with fatigue. Pt moving hands on RW and required constant cues to maintain hands in proper position on RW.      AM-PAC 6 CLICK MOBILITY  Turning over in bed (including adjusting bedclothes, sheets and blankets)?: 2  Sitting down on and standing up from a chair with arms (e.g., wheelchair, bedside commode, etc.): 2  Moving from lying on back to sitting on the side of the bed?: 2  Moving to and from a bed to a chair (including a wheelchair)?: 2  Need to walk in hospital room?: 1  Climbing 3-5 steps with a railing?: 1  Basic Mobility Total Score: 10       Therapeutic Activities and Exercises:  Pt leans to the right in supine, appears to be due to attempting to off-weight L hip.  Instructed in LLE supine exercises x 10 reps: APs, heel slides (AAROM LLE), hip abduction slides (AAROM LLE).  Pt able to assist with RLE off bed this date but continues to require assist for LLE and trunk- initially with posterior leaning in sitting which improved.  Transferred to sit in  chair and with no control of descent into chair, requiring max A from therapists.  LEs reclined- educated to complete APs, QS, heel slides, and hip abduction slides while sitting in chair.    Patient left up in chair with all lines intact, call button in reach, chair alarm on and RN notified..    GOALS:   Multidisciplinary Problems     Physical Therapy Goals        Problem: Physical Therapy Goal    Goal Priority Disciplines Outcome Goal Variances Interventions   Physical Therapy Goal     PT, PT/OT Ongoing (interventions implemented as appropriate)     Description:  Goals to be met by: 18     Patient will increase functional independence with mobility by performin. Supine <> sit with MInimal Assistance  2. Sit to stand transfer with Contact Guard Assistance  3. Bed to chair transfer with Contact Guard Assistance using Rolling Walker  4. Gait  x 50 feet with Contact Guard Assistance using Rolling Walker.   5. Lower extremity exercise program x 10 reps per handout, with assistance as needed                      Time Tracking:     PT Received On: 18  PT Start Time: 1047     PT Stop Time: 1119  PT Total Time (min): 32 min with OT    Billable Minutes: Therapeutic Activity 16    Treatment Type: Treatment  PT/PTA: PT     PTA Visit Number: 0     Kylah Willson, PT  2018

## 2018-11-29 NOTE — PROGRESS NOTES
Pt BP is elevated 187/79 by machine and 182/80 manually HR was 97. Called Dr Rodney. MD said give pain med first and recheck and also will put something PRN for BP

## 2018-11-29 NOTE — PLAN OF CARE
Problem: Patient Care Overview  Goal: Plan of Care Review  Outcome: Ongoing (interventions implemented as appropriate)  Pt on 1 LPM NC, O2 sats notated. Will continue to monitor.

## 2018-11-30 NOTE — PT/OT/SLP DISCHARGE
Physical Therapy Discharge Summary    Name: Luda Carranza  MRN: [id number]   Principal Problem: Closed displaced intertrochanteric fracture of left femur     Patient Discharged from acute Physical Therapy on 18.  Please refer to prior PT noted date on 18 for functional status.     Assessment:     Patient appropriate for care in another setting.    Objective:     GOALS:   Multidisciplinary Problems     Physical Therapy Goals     Not on file          Multidisciplinary Problems (Resolved)        Problem: Physical Therapy Goal    Goal Priority Disciplines Outcome Goal Variances Interventions   Physical Therapy Goal   (Resolved)     PT, PT/OT Outcome(s) achieved     Description:  Goals to be met by: 18     Patient will increase functional independence with mobility by performin. Supine <> sit with MInimal Assistance  2. Sit to stand transfer with Contact Guard Assistance  3. Bed to chair transfer with Contact Guard Assistance using Rolling Walker  4. Gait  x 50 feet with Contact Guard Assistance using Rolling Walker.   5. Lower extremity exercise program x 10 reps per handout, with assistance as needed                      Reasons for Discontinuation of Therapy Services  Transfer to alternate level of care.      Plan:     Patient Discharged to: Inpatient Rehab.    Kylah Willson, PT  2018

## 2018-11-30 NOTE — PROGRESS NOTES
[facility] Spoke with AMARJIT Loya, patient currently beinsg transferred.

## 2018-12-03 ENCOUNTER — TELEPHONE (OUTPATIENT)
Dept: ORTHOPEDICS | Facility: CLINIC | Age: 78
End: 2018-12-03

## 2018-12-03 NOTE — TELEPHONE ENCOUNTER
Spoke with patient's son in law, and scheduled appointment. He was made aware of date, time, and location. Verbalized understanding.

## 2018-12-04 ENCOUNTER — TELEPHONE (OUTPATIENT)
Dept: ORTHOPEDICS | Facility: CLINIC | Age: 78
End: 2018-12-04

## 2018-12-04 NOTE — TELEPHONE ENCOUNTER
----- Message from Spring Patel sent at 12/4/2018 11:36 AM CST -----  Contact: Self [phone]  Patient is calling to talk to nurse in regards to her last procedure. Please advice

## 2018-12-05 ENCOUNTER — TELEPHONE (OUTPATIENT)
Dept: ORTHOPEDICS | Facility: CLINIC | Age: 78
End: 2018-12-05

## 2018-12-05 NOTE — PHYSICIAN QUERY
PT Name: Luda Carranza  MR #: [id number]    Physician Query Form - Respiratory Condition Clarification      CDS/: May Daniels               Contact information:  [email or web address]      This form is a permanent document in the medical record.    Query Date: December 5, 2018    By submitting this query, we are merely seeking further clarification of documentation. Please utilize your independent clinical judgment when addressing the question(s) below.    The Medical record contains the following:   Indicators   Supporting Clinical Findings Location in Medical Record   x Pulmonary Edema documented CXR showed diffuse nonspecific interstitial coarsening, possibly mild pulmonary edema.     H&P  Discharge summary      Wheezing, Productive cough, SOB, KRAFT, Use of accessory muscles documented      Radiology Findings      Hypoxia , Hypoxemia Hypoxic documented      Respiratory Distress documented      RR                  PaO2                  PaCO2                     O2 sat      Treatment:      Supplemental O2:      Oxygen dependence      Other:         Provider, please specify diagnosis or diagnoses associated with above clinical findings.      Pulmonary Edema (please specify acuity and type)      Acuity  Type    [  ] Acute  [  ] Cardiac (Specify cause)     [  ] Chronic  [  ] Non Cardiac (Specify cause)    [  ] Acute on Chronic  [  ] Unspecified     [   ] Other Respiratory Diagnosis (please specify):   [x ] Clinically Undetermined     Please document in your progress notes daily for the duration of treatment, until resolved, and include in your discharge summary.

## 2018-12-05 NOTE — TELEPHONE ENCOUNTER
----- Message from Michelle Bean sent at 12/5/2018  4:20 PM CST -----  Contact: Chele (son)/ [phone]  Patient's son called in returning your call.    Please call.

## 2018-12-21 ENCOUNTER — TELEPHONE (OUTPATIENT)
Dept: ORTHOPEDICS | Facility: CLINIC | Age: 78
End: 2018-12-21

## 2018-12-21 NOTE — TELEPHONE ENCOUNTER
----- Message from Spring Patel sent at 12/21/2018  2:29 PM CST -----  Contact: Chele [phone]  Patient is calling to see if she can be seen sooner than 1/17/19 for a follow up. Please advice

## 2018-12-31 ENCOUNTER — OFFICE VISIT (OUTPATIENT)
Dept: ORTHOPEDICS | Facility: CLINIC | Age: 78
End: 2018-12-31
Payer: COMMERCIAL

## 2018-12-31 ENCOUNTER — HOSPITAL ENCOUNTER (OUTPATIENT)
Dept: RADIOLOGY | Facility: HOSPITAL | Age: 78
Discharge: HOME | End: 2018-12-31
Attending: ORTHOPAEDIC SURGERY
Payer: COMMERCIAL

## 2018-12-31 VITALS — BODY MASS INDEX: 29.77 KG/M2 | HEIGHT: 63 IN | WEIGHT: 168 LBS

## 2018-12-31 DIAGNOSIS — M25.559 ARTHRALGIA OF HIP, UNSPECIFIED LATERALITY: Primary | ICD-10-CM

## 2018-12-31 DIAGNOSIS — S72.142D CLOSED DISPLACED INTERTROCHANTERIC FRACTURE OF LEFT FEMUR WITH ROUTINE HEALING, SUBSEQUENT ENCOUNTER: ICD-10-CM

## 2018-12-31 DIAGNOSIS — M25.559 ARTHRALGIA OF HIP, UNSPECIFIED LATERALITY: ICD-10-CM

## 2018-12-31 PROCEDURE — 73502 XR HIP 2 VIEW LEFT: ICD-10-PCS | Mod: 26,LT,, | Performed by: RADIOLOGY

## 2018-12-31 PROCEDURE — 99999 PR PBB SHADOW E&M-EST. PATIENT-LVL III: CPT | Mod: PBBFAC,,, | Performed by: ORTHOPAEDIC SURGERY

## 2018-12-31 PROCEDURE — 73502 X-RAY EXAM HIP UNI 2-3 VIEWS: CPT | Mod: 26,LT,, | Performed by: RADIOLOGY

## 2018-12-31 PROCEDURE — 73502 X-RAY EXAM HIP UNI 2-3 VIEWS: CPT | Mod: TC,PN,LT

## 2018-12-31 PROCEDURE — 99024 POSTOP FOLLOW-UP VISIT: CPT | Mod: S$GLB,,, | Performed by: ORTHOPAEDIC SURGERY

## 2018-12-31 NOTE — PROGRESS NOTES
HISTORY OF PRESENT ILLNESS:  Ms. Carranza in followup ORIF of left subtroch hip   fracture.  She is about one month's postop.  She brought with her family today.    They have started some outpatient therapy for her and she is doing well.    PHYSICAL EXAMINATION:  LEFT HIP:  Incision is well healed.  No evidence of infection.  Slight   tenderness.  Range of motion of hip slightly decreased.  Clinical alignment   looks good.    X-RAYS:  AP and lateral left hip demonstrate healing subtroch hip fracture,   hardware intact, good position, callus noted.    PLAN:  Continue therapy, weightbear as tolerated, Advil or Motrin by mouth.    Follow up in one month.      SANDRA/SUDHA  dd: 12/31/2018 15:19:09 (CST)  td: 01/01/2019 06:08:28 (CST)  Doc ID   #[id number]  Job ID #[id number]    CC:

## 2019-01-07 ENCOUNTER — LAB VISIT (OUTPATIENT)
Dept: LAB | Facility: HOSPITAL | Age: 79
End: 2019-01-07
Attending: INTERNAL MEDICINE
Payer: COMMERCIAL

## 2019-01-07 ENCOUNTER — OFFICE VISIT (OUTPATIENT)
Dept: INTERNAL MEDICINE | Facility: CLINIC | Age: 79
End: 2019-01-07
Payer: COMMERCIAL

## 2019-01-07 ENCOUNTER — TELEPHONE (OUTPATIENT)
Dept: ORTHOPEDICS | Facility: CLINIC | Age: 79
End: 2019-01-07

## 2019-01-07 VITALS
SYSTOLIC BLOOD PRESSURE: 140 MMHG | BODY MASS INDEX: 29.76 KG/M2 | HEART RATE: 81 BPM | DIASTOLIC BLOOD PRESSURE: 70 MMHG | HEIGHT: 63 IN | OXYGEN SATURATION: 96 %

## 2019-01-07 DIAGNOSIS — I10 ESSENTIAL HYPERTENSION: ICD-10-CM

## 2019-01-07 DIAGNOSIS — Z76.89 ENCOUNTER TO ESTABLISH CARE: Primary | ICD-10-CM

## 2019-01-07 DIAGNOSIS — E11.9 TYPE 2 DIABETES MELLITUS WITHOUT COMPLICATION, WITHOUT LONG-TERM CURRENT USE OF INSULIN: ICD-10-CM

## 2019-01-07 DIAGNOSIS — F41.9 ANXIETY: ICD-10-CM

## 2019-01-07 DIAGNOSIS — R10.9 ABDOMINAL PAIN, UNSPECIFIED ABDOMINAL LOCATION: ICD-10-CM

## 2019-01-07 DIAGNOSIS — Z87.440 HISTORY OF UTI: ICD-10-CM

## 2019-01-07 DIAGNOSIS — D64.9 NORMOCYTIC ANEMIA: ICD-10-CM

## 2019-01-07 DIAGNOSIS — M25.551 PAIN OF RIGHT HIP JOINT: Primary | ICD-10-CM

## 2019-01-07 DIAGNOSIS — F51.04 PSYCHOPHYSIOLOGICAL INSOMNIA: ICD-10-CM

## 2019-01-07 LAB
ALBUMIN SERPL BCP-MCNC: 3.5 G/DL
ALBUMIN/CREAT UR: 28.2 UG/MG
ALP SERPL-CCNC: 187 U/L
ALT SERPL W/O P-5'-P-CCNC: 16 U/L
ANION GAP SERPL CALC-SCNC: 9 MMOL/L
AST SERPL-CCNC: 20 U/L
BASOPHILS # BLD AUTO: 0.08 K/UL
BASOPHILS NFR BLD: 0.8 %
BILIRUB SERPL-MCNC: 0.2 MG/DL
BILIRUB UR QL STRIP: NEGATIVE
BUN SERPL-MCNC: 9 MG/DL
CALCIUM SERPL-MCNC: 10 MG/DL
CHLORIDE SERPL-SCNC: 100 MMOL/L
CHOLEST SERPL-MCNC: 145 MG/DL
CHOLEST/HDLC SERPL: 3.1 {RATIO}
CLARITY UR: ABNORMAL
CO2 SERPL-SCNC: 25 MMOL/L
COLOR UR: YELLOW
CREAT SERPL-MCNC: 0.6 MG/DL
CREAT UR-MCNC: 78 MG/DL
DIFFERENTIAL METHOD: ABNORMAL
EOSINOPHIL # BLD AUTO: 0.2 K/UL
EOSINOPHIL NFR BLD: 2.4 %
ERYTHROCYTE [DISTWIDTH] IN BLOOD BY AUTOMATED COUNT: 13.2 %
EST. GFR  (AFRICAN AMERICAN): >60 ML/MIN/1.73 M^2
EST. GFR  (NON AFRICAN AMERICAN): >60 ML/MIN/1.73 M^2
ESTIMATED AVG GLUCOSE: 128 MG/DL
FERRITIN SERPL-MCNC: 50 NG/ML
FOLATE SERPL-MCNC: 14.5 NG/ML
GLUCOSE SERPL-MCNC: 113 MG/DL
GLUCOSE UR QL STRIP: NEGATIVE
HBA1C MFR BLD HPLC: 6.1 %
HCT VFR BLD AUTO: 41.6 %
HDLC SERPL-MCNC: 47 MG/DL
HDLC SERPL: 32.4 %
HGB BLD-MCNC: 12.4 G/DL
HGB UR QL STRIP: NEGATIVE
IMM GRANULOCYTES # BLD AUTO: 0.04 K/UL
IMM GRANULOCYTES NFR BLD AUTO: 0.4 %
IRON SERPL-MCNC: 61 UG/DL
KETONES UR QL STRIP: NEGATIVE
LDLC SERPL CALC-MCNC: 77.4 MG/DL
LEUKOCYTE ESTERASE UR QL STRIP: NEGATIVE
LYMPHOCYTES # BLD AUTO: 2.3 K/UL
LYMPHOCYTES NFR BLD: 23 %
MCH RBC QN AUTO: 27.5 PG
MCHC RBC AUTO-ENTMCNC: 29.8 G/DL
MCV RBC AUTO: 92 FL
MICROALBUMIN UR DL<=1MG/L-MCNC: 22 UG/ML
MICROSCOPIC COMMENT: NORMAL
MONOCYTES # BLD AUTO: 0.8 K/UL
MONOCYTES NFR BLD: 7.7 %
NEUTROPHILS # BLD AUTO: 6.6 K/UL
NEUTROPHILS NFR BLD: 65.7 %
NITRITE UR QL STRIP: NEGATIVE
NONHDLC SERPL-MCNC: 98 MG/DL
NRBC BLD-RTO: 0 /100 WBC
PH UR STRIP: 6 [PH] (ref 5–8)
PLATELET # BLD AUTO: 356 K/UL
PMV BLD AUTO: 10.1 FL
POTASSIUM SERPL-SCNC: 4.4 MMOL/L
PROT SERPL-MCNC: 7.8 G/DL
PROT UR QL STRIP: NEGATIVE
RBC # BLD AUTO: 4.51 M/UL
SATURATED IRON: 16 %
SODIUM SERPL-SCNC: 134 MMOL/L
SP GR UR STRIP: 1.02 (ref 1–1.03)
TOTAL IRON BINDING CAPACITY: 389 UG/DL
TRANSFERRIN SERPL-MCNC: 263 MG/DL
TRIGL SERPL-MCNC: 103 MG/DL
URN SPEC COLLECT METH UR: ABNORMAL
UROBILINOGEN UR STRIP-ACNC: NEGATIVE EU/DL
VIT B12 SERPL-MCNC: 446 PG/ML
WBC # BLD AUTO: 10.06 K/UL

## 2019-01-07 PROCEDURE — 83540 ASSAY OF IRON: CPT

## 2019-01-07 PROCEDURE — 80061 LIPID PANEL: CPT

## 2019-01-07 PROCEDURE — 82607 VITAMIN B-12: CPT

## 2019-01-07 PROCEDURE — 82746 ASSAY OF FOLIC ACID SERUM: CPT

## 2019-01-07 PROCEDURE — 99999 PR PBB SHADOW E&M-EST. PATIENT-LVL III: ICD-10-PCS | Mod: PBBFAC,,, | Performed by: INTERNAL MEDICINE

## 2019-01-07 PROCEDURE — 83036 HEMOGLOBIN GLYCOSYLATED A1C: CPT

## 2019-01-07 PROCEDURE — 82728 ASSAY OF FERRITIN: CPT

## 2019-01-07 PROCEDURE — 81000 URINALYSIS NONAUTO W/SCOPE: CPT | Mod: PO

## 2019-01-07 PROCEDURE — 36415 COLL VENOUS BLD VENIPUNCTURE: CPT | Mod: PO

## 2019-01-07 PROCEDURE — 99204 PR OFFICE/OUTPT VISIT, NEW, LEVL IV, 45-59 MIN: ICD-10-PCS | Mod: S$GLB,,, | Performed by: INTERNAL MEDICINE

## 2019-01-07 PROCEDURE — 87086 URINE CULTURE/COLONY COUNT: CPT

## 2019-01-07 PROCEDURE — 85025 COMPLETE CBC W/AUTO DIFF WBC: CPT

## 2019-01-07 PROCEDURE — 99999 PR PBB SHADOW E&M-EST. PATIENT-LVL III: CPT | Mod: PBBFAC,,, | Performed by: INTERNAL MEDICINE

## 2019-01-07 PROCEDURE — 82043 UR ALBUMIN QUANTITATIVE: CPT

## 2019-01-07 PROCEDURE — 99204 OFFICE O/P NEW MOD 45 MIN: CPT | Mod: S$GLB,,, | Performed by: INTERNAL MEDICINE

## 2019-01-07 PROCEDURE — 80053 COMPREHEN METABOLIC PANEL: CPT

## 2019-01-07 RX ORDER — TRAZODONE HYDROCHLORIDE 50 MG/1
50 TABLET ORAL NIGHTLY PRN
Qty: 30 TABLET | Refills: 3 | Status: SHIPPED | OUTPATIENT
Start: 2019-01-07 | End: 2019-04-22 | Stop reason: SDUPTHER

## 2019-01-07 RX ORDER — ESCITALOPRAM OXALATE 10 MG/1
10 TABLET ORAL DAILY
Qty: 90 TABLET | Refills: 3 | Status: SHIPPED | OUTPATIENT
Start: 2019-01-07 | End: 2020-01-07

## 2019-01-07 RX ORDER — TAMSULOSIN HYDROCHLORIDE 0.4 MG/1
0.4 CAPSULE ORAL DAILY
COMMUNITY

## 2019-01-07 RX ORDER — ESOMEPRAZOLE MAGNESIUM 40 MG/1
40 CAPSULE, DELAYED RELEASE ORAL
Qty: 30 CAPSULE | Refills: 3 | Status: SHIPPED | OUTPATIENT
Start: 2019-01-07 | End: 2019-01-08

## 2019-01-07 NOTE — TELEPHONE ENCOUNTER
----- Message from Spring Patel sent at 1/7/2019  7:36 AM CST -----  Contact: Self [phone]  Patient is requesting to talk to nurse in regards to patients physical therapy. Please advice

## 2019-01-07 NOTE — TELEPHONE ENCOUNTER
Spoke with pt's son in law, stated that the pt may have to go to [place] and needs orders for therapy. Also he verbalized that his wife needs a letter stating that she's her mother's caregiver. Understanding verbalized.

## 2019-01-07 NOTE — PROGRESS NOTES
Subjective:       Patient ID: Luda Carranza is a 79 y.o. female.    Chief Complaint: Abdominal Pain and Hypertension    HPI Mrs. Carranza is a 79-year-old female with hypertension, anxiety, insomnia, abdominal pain, type 2 diabetes who presents to [facility] care.  She also has chief complaint hypertension, anxiety, insomnia, abdominal pain. Of note, she recently had a hip replacement and was in a rehab facility for 2 weeks afterwards.  She had a UTI in the rehab facility.  She was treated with Macrobid for this.  She was also placed on tamsulosin for urinary retention.  She was discharged from the rehab facility on December 19th.  She has been living with her daughter and son-in-law since that time.  Patient can sit and stand with walker.  But she presents to clinic today in a wheelchair.  She has gone to physical therapy and will continue to do so.  Patient complains of a burning pain in the stomach.  It has been present for 4-5 days.  Eating more makes it uncomfortable.  She has been belching a lot.  She has had an associated decrease in appetite as well.  She does not have any associated constipation.  She has been having normal daily stools.  She does not have any associated nausea or vomiting.  Her family reports that she has lots of anxiety.  Anxiety has been a chronic issue for her.  She has been maintained on 5 mg of Lexapro for quite some time.  She has been on clonazepam in the past.  She was then tapered off of this.  However she was recently restarted on clonazepam which she takes 3 times a day.  She has been on an increased Lexapro dose of 10 mg daily for the last 1 day.  She has not been sleeping well at night.  Despite taking clonazepam 3 times daily, including 1 dose at nighttime, she has been unable to fall asleep and stay asleep.  She will typically sleep for short period of time maybe 4 hr and then be up again.  Her diabetes has been well controlled on her current medication.  She is not having  any dysuria or pelvic pain; however the family would like to have her checked for urinary tract infection.  They have a psychiatrist in their family who alerted them to the fact that urinary tract infections can cause elderly patients to become disoriented or agitated.  She has previously been treated with losartan-hydrochlorothiazide for blood pressure control.  However since discharge she has only been taken on losartan (not the hydrochlorothiazide component) and a beta-blocker.  The reason for this change is unknown.    Review of Systems   Constitutional: Positive for appetite change. Negative for fever.   Gastrointestinal: Positive for abdominal pain. Negative for blood in stool, constipation, diarrhea, nausea and vomiting.   Genitourinary: Negative for dysuria and pelvic pain.   Psychiatric/Behavioral: Positive for sleep disturbance. The patient is nervous/anxious.        Objective:      Physical Exam   Constitutional: She is oriented to person, place, and time. She appears well-developed and well-nourished. No distress.   HENT:   Head: Normocephalic and atraumatic.   Eyes: Conjunctivae and EOM are normal.   Cardiovascular: Normal rate, regular rhythm, normal heart sounds and intact distal pulses. Exam reveals no gallop and no friction rub.   No murmur heard.  Pulmonary/Chest: Effort normal and breath sounds normal. No stridor. No respiratory distress. She has no wheezes. She has no rales.   Musculoskeletal: She exhibits no edema or deformity.   Neurological: She is alert and oriented to person, place, and time.   Skin: Skin is warm and dry. She is not diaphoretic.   Psychiatric: She has a normal mood and affect. Her behavior is normal. Judgment and thought content normal.   Vitals reviewed.      Assessment:       1. Encounter to establish care    2. Essential hypertension    3. Abdominal pain, unspecified abdominal location    4. Anxiety    5. Psychophysiological insomnia    6. Type 2 diabetes mellitus without  complication, without long-term current use of insulin    7. Normocytic anemia    8. History of UTI        Plan:     1.  Essential hypertension  Stable, well controlled  Continue current medications  CMP and lipids pending    2.  Abdominal pain  Suspect due to GERD  Nexium prescribed    3.  Anxiety  Continue Lexapro at 10 mg p.o. daily  Take clonazepam 0.25 mg p.o. twice daily.  Eliminate 3rd dose of the day. Planning to wean down and off. Family aware of this plan  Take trazodone 50 mg p.o. nightly as needed for sleep    4.  Insomnia  Trazodone as above    5.  Type 2 diabetes without complication  Stable, well controlled. Last A1C 6.0  CMP, lipids, A1c, urine microalbumin pending  Continue current medication    6.  Normocytic anemia  CBC, B12, folate, iron, TIBC, ferritin pending    7.  History of UTI  Urinalysis and urine culture pending    RTC within next one month for establish care appointment.

## 2019-01-08 RX ORDER — CLONAZEPAM 2 MG/1
0.25 TABLET ORAL 2 TIMES DAILY
COMMUNITY

## 2019-01-08 RX ORDER — CARVEDILOL 6.25 MG/1
6.25 TABLET ORAL 2 TIMES DAILY WITH MEALS
COMMUNITY

## 2019-01-08 RX ORDER — LOSARTAN POTASSIUM 100 MG/1
100 TABLET ORAL DAILY
COMMUNITY

## 2019-01-09 ENCOUNTER — TELEPHONE (OUTPATIENT)
Dept: INTERNAL MEDICINE | Facility: CLINIC | Age: 79
End: 2019-01-09

## 2019-01-09 ENCOUNTER — TELEPHONE (OUTPATIENT)
Dept: ORTHOPEDICS | Facility: CLINIC | Age: 79
End: 2019-01-09

## 2019-01-09 DIAGNOSIS — S72.009D CLOSED FRACTURE OF HIP WITH ROUTINE HEALING, UNSPECIFIED LATERALITY, SUBSEQUENT ENCOUNTER: Primary | ICD-10-CM

## 2019-01-09 LAB
BACTERIA UR CULT: NORMAL
BACTERIA UR CULT: NORMAL

## 2019-01-09 NOTE — TELEPHONE ENCOUNTER
Called patient urine results given and when to take Lexapro. The son in law requesting blood work results, Also, patient is not doing well and does not want to get out of bed. I will forward message to Dr. Beltran.              ----- Message from Nessa Willson sent at 1/9/2019  3:45 PM CST -----  Contact: son in law Chele [phone]  Patient requesting to speak with you regarding urine test results. Please advise.

## 2019-01-09 NOTE — TELEPHONE ENCOUNTER
----- Message from Nessa Willson sent at 1/9/2019  3:43 PM CST -----  Contact: son in law Chele [phone]  Patient's son in law is requesting to speak with you regarding a letter received.

## 2019-01-11 DIAGNOSIS — R74.8 ELEVATED ALKALINE PHOSPHATASE LEVEL: ICD-10-CM

## 2019-01-11 DIAGNOSIS — E61.1 IRON DEFICIENCY: Primary | ICD-10-CM

## 2019-01-11 DIAGNOSIS — E87.1 HYPONATREMIA: Primary | ICD-10-CM

## 2019-01-11 RX ORDER — FERROUS SULFATE 325(65) MG
325 TABLET ORAL
Qty: 30 TABLET | Refills: 3 | Status: SHIPPED | OUTPATIENT
Start: 2019-01-11

## 2019-01-17 ENCOUNTER — TELEPHONE (OUTPATIENT)
Dept: INTERNAL MEDICINE | Facility: CLINIC | Age: 79
End: 2019-01-17

## 2019-01-24 ENCOUNTER — TELEPHONE (OUTPATIENT)
Dept: ORTHOPEDICS | Facility: CLINIC | Age: 79
End: 2019-01-24

## 2019-01-24 NOTE — TELEPHONE ENCOUNTER
----- Message from Deya Mueller sent at 1/23/2019  4:57 PM CST -----  Contact: [phone]/[place] (pt son in law)  Pt son in law called to let you know that [place], which is reason for cancelling appt. Also wanted to let you know the patient is doing much better after being in your care. States you gave them beyond outstanding service and they wanted to acknowledge you, and say Thank You.     Please call patient son in law for more info and advise.

## 2019-04-22 DIAGNOSIS — F51.04 PSYCHOPHYSIOLOGICAL INSOMNIA: ICD-10-CM

## 2019-04-22 RX ORDER — TRAZODONE HYDROCHLORIDE 50 MG/1
TABLET ORAL
Qty: 90 TABLET | Refills: 0 | Status: SHIPPED | OUTPATIENT
Start: 2019-04-22

## 2019-10-08 NOTE — TELEPHONE ENCOUNTER
Spoke with son and informed letter is ready, will be at [place] office on tomorrow. Understanding verbalized.    Subjective   Savannah Valenzuela is a 49 y.o. female.     Chief Complaint   Patient presents with   • Earache     bilateral          Earache    There is pain in both ears. This is a new problem. The current episode started in the past 7 days. The problem occurs constantly. The problem has been gradually worsening. There has been no fever. The pain is mild. Associated symptoms include coughing and rhinorrhea. Pertinent negatives include no ear discharge, headaches or sore throat. She has tried ear drops for the symptoms. The treatment provided mild relief.        The following portions of the patient's history were reviewed and updated as appropriate: allergies, current medications, past social history and problem list.    Outpatient Medications Marked as Taking for the 10/8/19 encounter (Office Visit) with Mathew Lemos MD   Medication Sig Dispense Refill   • buPROPion XL (WELLBUTRIN XL) 150 MG 24 hr tablet Take 1 tablet by mouth Daily. 90 tablet 1   • FLUoxetine (PROzac) 40 MG capsule Take 1 capsule by mouth Daily. 90 capsule 1   • hydrochlorothiazide (MICROZIDE) 12.5 MG capsule Take 1 capsule by mouth Daily. 90 capsule 1   • ofloxacin (FLOXIN) 0.3 % otic solution Administer 1-2 drops into Affected ears 2 (Two) Times a Day for 7 days. 5 mL 0   • olmesartan (BENICAR) 40 MG tablet Take 0.5 tablets by mouth Daily. 45 tablet 1       Review of Systems   Constitutional: Negative for chills and fever.   HENT: Positive for ear pain and rhinorrhea. Negative for ear discharge and sore throat.    Respiratory: Positive for cough.    Neurological: Negative for headaches.       Objective   Vitals:    10/08/19 1531   BP: 138/72   Pulse: 88   Resp: 16   Temp: 98.8 °F (37.1 °C)   SpO2: 97%      Wt Readings from Last 3 Encounters:   10/08/19 110 kg (242 lb 9.6 oz)   06/17/19 105 kg (231 lb)   04/16/19 105 kg (231 lb 3.2 oz)    Body mass index is 40.37 kg/m².      Physical Exam   Constitutional: She appears well-developed and  well-nourished.   HENT:   Head: Normocephalic and atraumatic.   Right Ear: External ear normal.   Left Ear: External ear normal.   Mouth/Throat: Oropharynx is clear and moist.         Problem List Items Addressed This Visit     None      Visit Diagnoses     Acute otitis externa of left ear, unspecified type    -  Primary    Dysfunction of both eustachian tubes            Assessment/Plan   And with some pain in the left ear for about 9 days.  4 days ago she started on ofloxacin otic drops to the left ear.  She is now developed some muffled hearing in the right ear.  No pain in the right ear.  Left canal is tender with the speculum.  Otherwise normal bilateral exams.  I suspect she indeed had some otitis externa on the left ear.  Most of the other symptoms are likely eustachian tube dysfunction and allergy related.  She is taking Zyrtec daily.  We will add a Medrol Dosepak.             Dragon disclaimer:   Much of this encounter note is an electronic transcription/translation of spoken language to printed text. The electronic translation of spoken language may permit erroneous, or at times, nonsensical words or phrases to be inadvertently transcribed; Although I have reviewed the note for such errors, some may still exist.

## 2020-05-12 ENCOUNTER — PATIENT OUTREACH (OUTPATIENT)
Dept: ADMINISTRATIVE | Facility: HOSPITAL | Age: 80
End: 2020-05-12

## 2020-05-12 NOTE — PROGRESS NOTES
audit performed. Telephone Outreach regarding DM not seen in 12 months  Spoke w/ SOWMYA ESTES spouse who states his wife is [place], not plans to return in the near future - has physician there

## (undated) DEVICE — COVER OVERHEAD SURG LT BLUE

## (undated) DEVICE — BIT DRILL QC 3FLUTED 4.2X145 S

## (undated) DEVICE — SUT VICRYL CTD 2-0 GI 27 SH

## (undated) DEVICE — WIRE GUIDE 3.2MM 400MM
Type: IMPLANTABLE DEVICE | Site: HIP | Status: NON-FUNCTIONAL
Removed: 2018-11-26

## (undated) DEVICE — BLADE SURG CARBON STEEL #10

## (undated) DEVICE — SEE MEDLINE ITEM 157117

## (undated) DEVICE — PACK BASIC

## (undated) DEVICE — Device

## (undated) DEVICE — APPLICATOR CHLORAPREP ORN 26ML

## (undated) DEVICE — SEE MEDLINE ITEM 152622

## (undated) DEVICE — ELECTRODE REM PLYHSV RETURN 9

## (undated) DEVICE — SEE MEDLINE ITEM 146292

## (undated) DEVICE — SEE MEDLINE ITEM 152530

## (undated) DEVICE — DRAPE STERI U-SHAPED 47X51IN

## (undated) DEVICE — MANIFOLD 4 PORT

## (undated) DEVICE — PAD ABDOMINAL 5X9 STERILE

## (undated) DEVICE — DRESSING XEROFORM FOIL PK 1X8

## (undated) DEVICE — SEE MEDLINE ITEM 146231

## (undated) DEVICE — GLOVE PROTEXIS HYDROGEL SZ7.5

## (undated) DEVICE — DRAPE STERI-DRAPE 83X125 IOBAN

## (undated) DEVICE — SCREW TI LK IMN 5.0X48MM ST
Type: IMPLANTABLE DEVICE | Site: HIP | Status: NON-FUNCTIONAL
Removed: 2018-11-26

## (undated) DEVICE — SEE MEDLINE ITEM 156955

## (undated) DEVICE — GAUZE SPONGE 4X4 12PLY

## (undated) DEVICE — SHEET DRAPE MEDIUM

## (undated) DEVICE — PADDING CAST SPECIALIST 6X4YD

## (undated) DEVICE — SUT CTD VICRYL CT-1 27

## (undated) DEVICE — SPONGE LAP 18X18 PREWASHED

## (undated) DEVICE — SYR MEDALLION 10 ML

## (undated) DEVICE — PAD OVERLAY MATTRESS 32X73X3IN

## (undated) DEVICE — REAMER STEPPED DHS DCS

## (undated) DEVICE — SEE MEDLINE ITEM 157131

## (undated) DEVICE — ALCOHOL 70% ISOP W/GREEN 16OZ

## (undated) DEVICE — SUPPORT ULNA NERVE PROTECTOR

## (undated) DEVICE — STAPLER SKIN ROTATING HEAD